# Patient Record
Sex: FEMALE | Race: WHITE | NOT HISPANIC OR LATINO | Employment: OTHER | URBAN - NONMETROPOLITAN AREA
[De-identification: names, ages, dates, MRNs, and addresses within clinical notes are randomized per-mention and may not be internally consistent; named-entity substitution may affect disease eponyms.]

---

## 2023-06-13 ENCOUNTER — OFFICE VISIT (OUTPATIENT)
Dept: FAMILY MEDICINE CLINIC | Facility: CLINIC | Age: 78
End: 2023-06-13
Payer: MEDICARE

## 2023-06-13 VITALS
OXYGEN SATURATION: 97 % | BODY MASS INDEX: 18.61 KG/M2 | HEART RATE: 83 BPM | DIASTOLIC BLOOD PRESSURE: 70 MMHG | SYSTOLIC BLOOD PRESSURE: 114 MMHG | WEIGHT: 105 LBS | HEIGHT: 63 IN

## 2023-06-13 DIAGNOSIS — M25.561 CHRONIC PAIN OF RIGHT KNEE: ICD-10-CM

## 2023-06-13 DIAGNOSIS — M23.206 OLD TEAR OF MENISCUS OF RIGHT KNEE, UNSPECIFIED MENISCUS, UNSPECIFIED TEAR TYPE: ICD-10-CM

## 2023-06-13 DIAGNOSIS — G25.81 RESTLESS LEG SYNDROME: Primary | ICD-10-CM

## 2023-06-13 DIAGNOSIS — G89.29 CHRONIC PAIN OF RIGHT KNEE: ICD-10-CM

## 2023-06-13 DIAGNOSIS — R00.2 PALPITATIONS: ICD-10-CM

## 2023-06-13 DIAGNOSIS — R26.81 GAIT INSTABILITY: ICD-10-CM

## 2023-06-13 PROBLEM — I10 HYPERTENSION, ESSENTIAL: Status: RESOLVED | Noted: 2023-06-13 | Resolved: 2023-06-13

## 2023-06-13 PROBLEM — I10 HYPERTENSION, ESSENTIAL: Status: ACTIVE | Noted: 2023-06-13

## 2023-06-13 RX ORDER — DIAZEPAM 5 MG/1
5 TABLET ORAL 2 TIMES DAILY PRN
COMMUNITY
End: 2023-06-13

## 2023-06-13 RX ORDER — ATENOLOL 25 MG/1
25 TABLET ORAL DAILY
Qty: 90 TABLET | Refills: 1 | Status: SHIPPED | OUTPATIENT
Start: 2023-06-13

## 2023-06-13 RX ORDER — ATENOLOL 25 MG/1
25 TABLET ORAL DAILY
COMMUNITY
End: 2023-06-13 | Stop reason: SDUPTHER

## 2023-06-13 RX ORDER — ROPINIROLE 0.5 MG/1
TABLET, FILM COATED ORAL
Qty: 90 TABLET | Refills: 3 | Status: SHIPPED | OUTPATIENT
Start: 2023-06-13

## 2023-06-13 NOTE — ASSESSMENT & PLAN NOTE
Discussed recent work-up with orthopedic consultation and imaging including x-ray and MRI.  She is interested in starting with physical therapy and if not improving will consider another orthopedic opinion but does not want knee replacement at this time

## 2023-06-13 NOTE — LETTER
June 13, 2023     Patient: Mary Goncalves   YOB: 1945   Date of Visit: 6/13/2023           Dx R knee pain, history of meniscal tear, bilateral shoulder pain    Physical therapy up to 3 times/week for 6 weeks.  Please evaluate and treat.         Sincerely,          Scott Helton MD        CC: No Recipients

## 2023-06-13 NOTE — ASSESSMENT & PLAN NOTE
Discussed restless legs treatment and that Valium is not an effective treatment or recommended for chronic therapy at her age.    Lengthy discussion with patient and her daughter how to safely taper off Valium at her current 5 mg daily dosing.  She does have sufficient supply from her past PCP to taper off Valium without a refill prescription today.  She will reduce to 2.5 mg at bedtime for the next 1 to 2 weeks and then cut down to 1.25 mg at bedtime for the next 1 to 2 weeks and then go to every other day and stop Valium.    Start Requip at 0.25 mg with instructions to taper up to 0.5 mg.  She was given instructions that she can go up by 0.5 mg every week up to a max of 4 mg daily.    Recheck at follow-up in 1 month or sooner if problems arise

## 2023-06-13 NOTE — ASSESSMENT & PLAN NOTE
Continue atenolol for palpitation.  Working to get past records from cardiology.  No murmur appreciated on exam today.  Reports mitral valve prolapse and she was instructed to premedicate before dental cleaning.  Discussed this is no longer recommendation for mitral valve prolapse but we will need to review her records in more detail to make sure she does not have any other underlying problems that would require premedication before dental cleaning

## 2023-06-13 NOTE — PROGRESS NOTES
Chief Complaint  Establish Care, Leg Pain (Right leg pain since October of last year), and Insomnia (Patient reports that she isnt able to sleep at night)    Subjective    History of Present Illness:  Mary Goncalves is a 78 y.o. female who presents today for new patient visit to establish care and discuss ongoing problems with right knee and leg pain.    She is transferring from her past PCP in [facility].    Here with her daughter and reports a history of palpitations with mitral valve prolapse.  She does have cardiology follow-up in [place].  No history of murmur.  She is on atenolol for palpitations and is requesting refills.    She reports that she has been on Valium for years from her past PCP for restless legs problems.  Her prescription is written at 5 mg twice a day but she only takes 5 mg at bedtime.  She has not been on any medications specific for restless legs and reports that her restless leg symptoms are not adequately controlled with Valium.    Discussed with patient and her daughter at length today concerns with valium especially in older adults due to its metabolism problems and increased fall risk along with increased risk for cognitive issues including dementia memory problems.    She is willing to taper off Valium and has a sufficient quantity to allow her to safely taper off her prescription from her past PCP without refills today.    We discussed in detail how to slowly taper off Valium and transition to Requip for restless legs.    She is having ongoing problems with right knee and leg pain and is seeing orthopedics in [facility] with x-ray and MRI work-up that revealed arthritis and a meniscal tear.  They did not recommend repair of her meniscal tear but did discuss knee replacement.  She is not interested in knee replacement at this time and has tried steroid injections along with gel injections without improvement.  She is requesting an order for physical therapy given  "her recurrent right knee problems and bilateral shoulder problems that have developed since she has had to use a walker more due to knee instability.    Discussed past-due for health maintenance and screening along with vaccination updates and she plans to get up-to-date in the future with her Medicare wellness visit and get fasting labs up-to-date.    Objective   Vital Signs:   /70 (BP Location: Left arm, Patient Position: Sitting, Cuff Size: Adult)   Pulse 83   Ht 160 cm (63\")   Wt 47.6 kg (105 lb)   SpO2 97%   BMI 18.60 kg/m²     Review of Systems   Constitutional:  Negative for appetite change, chills and fever.   HENT:  Negative for hearing loss.    Eyes:  Negative for blurred vision.   Respiratory:  Negative for chest tightness.    Cardiovascular:  Negative for chest pain.   Gastrointestinal:  Negative for abdominal pain.   Musculoskeletal:  Positive for arthralgias and gait problem.   Skin:  Negative for rash.   Neurological:         Restless legs   Psychiatric/Behavioral:  Positive for sleep disturbance. Negative for depressed mood.      Past History:  Medical History: has no past medical history on file.   Surgical History: has no past surgical history on file.   Family History: Family history is unknown by patient.   Social History: reports that she has never smoked. She has never used smokeless tobacco. She reports that she does not currently use alcohol. She reports that she does not use drugs.      Current Outpatient Medications:     atenolol (TENORMIN) 25 MG tablet, Take 1 tablet by mouth Daily., Disp: 90 tablet, Rfl: 1    rOPINIRole (REQUIP) 0.5 MG tablet, 1/2 tab po QHS x 2 days, then 1 po QHS.  May increase by 1 tab every week as needed for restless legs.  Max 4mg/evening.  Replaces Valium, Disp: 90 tablet, Rfl: 3    Allergies: Flexeril [cyclobenzaprine] and Statins    Physical Exam  Constitutional:       Appearance: Normal appearance.   HENT:      Head: Normocephalic.      Right Ear: " External ear normal.      Left Ear: External ear normal.      Nose: Nose normal.   Eyes:      Pupils: Pupils are equal, round, and reactive to light.   Cardiovascular:      Rate and Rhythm: Normal rate and regular rhythm.      Heart sounds: Normal heart sounds. No murmur heard.    No friction rub. No gallop.   Pulmonary:      Effort: Pulmonary effort is normal.      Breath sounds: Normal breath sounds.   Musculoskeletal:      Cervical back: Normal range of motion.      Comments: Using walker for ambulatory support given right leg and knee pain.  No effusion.  No joint line tenderness.  Mild crepitus.  No knee instability on exam.   Skin:     General: Skin is warm and dry.   Neurological:      General: No focal deficit present.      Mental Status: She is alert and oriented to person, place, and time.      Gait: Gait abnormal.   Psychiatric:         Mood and Affect: Mood normal.         Behavior: Behavior normal.         Thought Content: Thought content normal.        Result Review                   Assessment and Plan  Diagnoses and all orders for this visit:    1. Restless leg syndrome (Primary)  Assessment & Plan:  Discussed restless legs treatment and that Valium is not an effective treatment or recommended for chronic therapy at her age.    Lengthy discussion with patient and her daughter how to safely taper off Valium at her current 5 mg daily dosing.  She does have sufficient supply from her past PCP to taper off Valium without a refill prescription today.  She will reduce to 2.5 mg at bedtime for the next 1 to 2 weeks and then cut down to 1.25 mg at bedtime for the next 1 to 2 weeks and then go to every other day and stop Valium.    Start Requip at 0.25 mg with instructions to taper up to 0.5 mg.  She was given instructions that she can go up by 0.5 mg every week up to a max of 4 mg daily.    Recheck at follow-up in 1 month or sooner if problems arise    Orders:  -     rOPINIRole (REQUIP) 0.5 MG tablet; 1/2  tab po QHS x 2 days, then 1 po QHS.  May increase by 1 tab every week as needed for restless legs.  Max 4mg/evening.  Replaces Valium  Dispense: 90 tablet; Refill: 3    2. Chronic pain of right knee  Assessment & Plan:  Discussed recent work-up with orthopedic consultation and imaging including x-ray and MRI.  She is interested in starting with physical therapy and if not improving will consider another orthopedic opinion but does not want knee replacement at this time      3. Old tear of meniscus of right knee, unspecified meniscus, unspecified tear type  Assessment & Plan:  See above      4. Gait instability  Assessment & Plan:  See above      5. Palpitations  Assessment & Plan:  Continue atenolol for palpitation.  Working to get past records from cardiology.  No murmur appreciated on exam today.  Reports mitral valve prolapse and she was instructed to premedicate before dental cleaning.  Discussed this is no longer recommendation for mitral valve prolapse but we will need to review her records in more detail to make sure she does not have any other underlying problems that would require premedication before dental cleaning    Orders:  -     atenolol (TENORMIN) 25 MG tablet; Take 1 tablet by mouth Daily.  Dispense: 90 tablet; Refill: 1        BMI is within normal parameters. No other follow-up for BMI required.          Follow Up  Return in about 6 weeks (around 7/25/2023) for Med recheck, Fasting for labs at appointment (but drink water!).    Scott Helton MD

## 2023-08-08 ENCOUNTER — OFFICE VISIT (OUTPATIENT)
Dept: FAMILY MEDICINE CLINIC | Facility: CLINIC | Age: 78
End: 2023-08-08
Payer: MEDICARE

## 2023-08-08 VITALS
HEART RATE: 76 BPM | TEMPERATURE: 97.3 F | WEIGHT: 102.6 LBS | OXYGEN SATURATION: 99 % | BODY MASS INDEX: 18.18 KG/M2 | HEIGHT: 63 IN | SYSTOLIC BLOOD PRESSURE: 110 MMHG | DIASTOLIC BLOOD PRESSURE: 72 MMHG

## 2023-08-08 DIAGNOSIS — R26.81 GAIT INSTABILITY: ICD-10-CM

## 2023-08-08 DIAGNOSIS — E55.9 VITAMIN D DEFICIENCY: ICD-10-CM

## 2023-08-08 DIAGNOSIS — M70.50 PES ANSERINE BURSITIS: ICD-10-CM

## 2023-08-08 DIAGNOSIS — G25.81 RESTLESS LEG SYNDROME: Chronic | ICD-10-CM

## 2023-08-08 DIAGNOSIS — E78.01 FAMILIAL HYPERCHOLESTEREMIA: ICD-10-CM

## 2023-08-08 DIAGNOSIS — Z79.899 HIGH RISK MEDICATION USE: ICD-10-CM

## 2023-08-08 DIAGNOSIS — G89.29 CHRONIC PAIN OF RIGHT KNEE: Chronic | ICD-10-CM

## 2023-08-08 DIAGNOSIS — M25.561 CHRONIC PAIN OF RIGHT KNEE: Chronic | ICD-10-CM

## 2023-08-08 DIAGNOSIS — E78.2 HYPERLIPIDEMIA, MIXED: ICD-10-CM

## 2023-08-08 DIAGNOSIS — Z11.59 NEED FOR HEPATITIS C SCREENING TEST: ICD-10-CM

## 2023-08-08 DIAGNOSIS — Z78.0 ENCOUNTER FOR OSTEOPOROSIS SCREENING IN ASYMPTOMATIC POSTMENOPAUSAL PATIENT: ICD-10-CM

## 2023-08-08 DIAGNOSIS — M79.604 ANTERIOR LEG PAIN, RIGHT: ICD-10-CM

## 2023-08-08 DIAGNOSIS — L98.422 SKIN ULCER OF SACRUM WITH FAT LAYER EXPOSED: ICD-10-CM

## 2023-08-08 DIAGNOSIS — Z00.00 GENERAL MEDICAL EXAM: Primary | ICD-10-CM

## 2023-08-08 DIAGNOSIS — R00.2 PALPITATIONS: Chronic | ICD-10-CM

## 2023-08-08 DIAGNOSIS — R25.2 MUSCLE CRAMP: ICD-10-CM

## 2023-08-08 DIAGNOSIS — M81.0 AGE-RELATED OSTEOPOROSIS WITHOUT CURRENT PATHOLOGICAL FRACTURE: ICD-10-CM

## 2023-08-08 DIAGNOSIS — Z13.820 ENCOUNTER FOR OSTEOPOROSIS SCREENING IN ASYMPTOMATIC POSTMENOPAUSAL PATIENT: ICD-10-CM

## 2023-08-08 NOTE — PROGRESS NOTES
The ABCs of the Annual Wellness Visit  Subsequent Medicare Wellness Visit    Subjective    Mary Goncalves is a 78 y.o. female who presents for a Subsequent Medicare Wellness Visit and physical exam.    She is also here to follow-up after establishing care last month and is again here with her daughter for today's visit.    She has ongoing problems with his right anterior leg pain inferior to the patellar tendon area concerning for pes anserine bursitis.  She has undergone work-up with orthopedics with failed knee injections and physical therapy.  She is using a walker due to her recurrent leg pain.  She is interested in a consultation given concerns for pes anserine bursitis with Dr. Reyes..    The following portions of the patient's history were reviewed and   updated as appropriate: allergies, current medications, past family history, past medical history, past social history, past surgical history, and problem list.    Compared to one year ago, the patient feels her physical   health is worse.    Compared to one year ago, the patient feels her mental   health is the same.    Recent Hospitalizations:  She was not admitted to the hospital during the last year.       Current Medical Providers:  Patient Care Team:  Scott Helton MD as PCP - General (Family Medicine)    Outpatient Medications Prior to Visit   Medication Sig Dispense Refill    atenolol (TENORMIN) 25 MG tablet Take 1 tablet by mouth Daily. 90 tablet 1    rOPINIRole (REQUIP) 0.5 MG tablet 1/2 tab po QHS x 2 days, then 1 po QHS.  May increase by 1 tab every week as needed for restless legs.  Max 4mg/evening.  Replaces Valium (Patient taking differently: Take 2 tablets by mouth Every Night.) 90 tablet 3     No facility-administered medications prior to visit.       No opioid medication identified on active medication list. I have reviewed chart for other potential  high risk medication/s and harmful drug interactions in the  "elderly.        Aspirin is not on active medication list.  Aspirin use is not indicated based on review of current medical condition/s. Risk of harm outweighs potential benefits.  .    Patient Active Problem List   Diagnosis    Restless leg syndrome    Chronic pain of right knee    Old tear of meniscus of right knee    Gait instability    Palpitations    Anterior leg pain, right    General medical exam    Familial hypercholesteremia    Skin ulcer of sacrum with fat layer exposed     Advance Care Planning   Advance Care Planning     Advance Directive is not on file.  ACP discussion was held with the patient during this visit. Patient does not have an advance directive, information provided.     Objective    Vitals:    23 0937   BP: 110/72   BP Location: Right arm   Patient Position: Sitting   Cuff Size: Adult   Pulse: 76   Temp: 97.3 øF (36.3 øC)   TempSrc: Temporal   SpO2: 99%   Weight: 46.5 kg (102 lb 9.6 oz)   Height: 160 cm (63\")     Estimated body mass index is 18.17 kg/mý as calculated from the following:    Height as of this encounter: 160 cm (63\").    Weight as of this encounter: 46.5 kg (102 lb 9.6 oz).    BMI is below normal parameters (malnutrition). Recommendations: treating the underlying disease process      Does the patient have evidence of cognitive impairment? No          HEALTH RISK ASSESSMENT    Smoking Status:  Social History     Tobacco Use   Smoking Status Never   Smokeless Tobacco Never     Alcohol Consumption:  Social History     Substance and Sexual Activity   Alcohol Use Not Currently     Fall Risk Screen:    STEADI Fall Risk Assessment was completed, and patient is at LOW risk for falls.Assessment completed on:2023    Depression Screenin/13/2023     9:05 AM   PHQ-2/PHQ-9 Depression Screening   Little Interest or Pleasure in Doing Things 1-->several days   Feeling Down, Depressed or Hopeless 1-->several days   Trouble Falling or Staying Asleep, or Sleeping Too Much " 1-->several days   Feeling Tired or Having Little Energy 1-->several days   Poor Appetite or Overeating 1-->several days   Feeling Bad about Yourself - or that You are a Failure or Have Let Yourself or Your Family Down 1-->several days   Trouble Concentrating on Things, Such as Reading the Newspaper or Watching Television 1-->several days   Moving or Speaking So Slowly that Other People Could Have Noticed? Or the Opposite - Being So Fidgety 1-->several days   Thoughts that You Would be Better Off Dead or of Hurting Yourself in Some Way 0-->not at all   PHQ-9: Brief Depression Severity Measure Score 8   If You Checked Off Any Problems, How Difficult Have These Problems Made It For You to Do Your Work, Take Care of Things at Home, or Get Along with Other People? not difficult at all       Health Habits and Functional and Cognitive Screenin/8/2023    10:00 AM   Functional & Cognitive Status   Do you have difficulty preparing food and eating? No   Do you have difficulty bathing yourself, getting dressed or grooming yourself? No   Do you have difficulty using the toilet? No   Do you have difficulty moving around from place to place? No   Do you have trouble with steps or getting out of a bed or a chair? No   Current Diet Well Balanced Diet   Dental Exam Up to date   Eye Exam Up to date   Exercise (times per week) 0 times per week   Current Exercises Include No Regular Exercise   Do you need help using the phone?  No   Are you deaf or do you have serious difficulty hearing?  No   Do you need help to go to places out of walking distance? Yes   Do you need help shopping? Yes   Do you need help preparing meals?  No   Do you need help with housework?  No   Do you need help with laundry? No   Do you need help taking your medications? No   Do you need help managing money? No   Do you ever drive or ride in a car without wearing a seat belt? No   Have you felt unusual stress, anger or loneliness in the last month? Yes    Who do you live with? Alone   If you need help, do you have trouble finding someone available to you? No   Have you been bothered in the last four weeks by sexual problems? No   Do you have difficulty concentrating, remembering or making decisions? No       Age-appropriate Screening Schedule:  Refer to the list below for future screening recommendations based on patient's age, sex and/or medical conditions. Orders for these recommended tests are listed in the plan section. The patient has been provided with a written plan.    Health Maintenance   Topic Date Due    DXA SCAN  Never done    TDAP/TD VACCINES (1 - Tdap) Never done    HEPATITIS C SCREENING  Never done    LIPID PANEL  Never done    INFLUENZA VACCINE  10/01/2023    ANNUAL WELLNESS VISIT  08/08/2024    Pneumococcal Vaccine 65+  Completed    COVID-19 Vaccine  Discontinued    ZOSTER VACCINE  Discontinued                  CMS Preventative Services Quick Reference  Risk Factors Identified During Encounter  Chronic Pain: Natural history and expected course discussed. Questions answered.  Fall Risk-High or Moderate: Discussed Fall Prevention in the home  The above risks/problems have been discussed with the patient.  Pertinent information has been shared with the patient in the After Visit Summary.  An After Visit Summary and PPPS were made available to the patient.    Follow Up:   Next Medicare Wellness visit to be scheduled in 1 year.       Additional E&M Note during same encounter follows:  Patient has multiple medical problems which are significant and separately identifiable that require additional work above and beyond the Medicare Wellness Visit.      Chief Complaint  Restless Legs Syndrome    Subjective        HPI  Mary Goncalves is also being seen today for follow-up after establishing care last month and is again here with her daughter for today's visit.    She has ongoing problems with his right anterior leg pain inferior to the patellar tendon area  concerning for pes anserine bursitis.  She has undergone work-up with orthopedics with failed knee injections and physical therapy.  She is using a walker due to her recurrent leg pain.  She is interested in a consultation given concerns for pes anserine bursitis with Dr. Reyes.    History of osteoporosis and is willing to get up-to-date with bone density.  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  She is willing to consider Prolia based upon her bone density when it returns.    She is fasting to get blood work up-to-date and would like hepatitis C screening done with lab work today.    History of familial hypercholesterolemia and failed numerous statins.  We did discuss that he has an alternative and at this time she would like to hold off on cholesterol medications.  She does understand the risk of untreated hyperlipidemia.    Palpitations stable with atenolol and she has been on this for years and has not had any problems with blood pressure elevation.    Restless leg symptoms treated with Valium in the past by her past PCP or doing well with Requip up to 1 mg.  She is happy with her current dosing but does understand room to adjust up her dosing if needed.    She does have a history of vitamin D deficiency along with osteoporosis and is willing to get a vitamin D level checked with blood work today.        Review of Systems   Constitutional:  Negative for activity change, appetite change, chills, fatigue and fever.   HENT:  Negative for ear pain, hearing loss and trouble swallowing.    Eyes:  Negative for pain and visual disturbance.   Respiratory:  Negative for cough, chest tightness, shortness of breath and wheezing.    Cardiovascular:  Negative for chest pain, palpitations and leg swelling.   Gastrointestinal:  Negative for abdominal pain and blood in stool.   Genitourinary:  Negative for difficulty urinating.   Musculoskeletal:  Positive for arthralgias,  "back pain, gait problem, joint swelling and myalgias.   Skin:  Negative for rash.   Neurological:  Negative for dizziness, weakness and light-headedness.   Psychiatric/Behavioral:  Positive for sleep disturbance. Negative for agitation, behavioral problems and dysphoric mood.      Objective   Vital Signs:  /72 (BP Location: Right arm, Patient Position: Sitting, Cuff Size: Adult)   Pulse 76   Temp 97.3 øF (36.3 øC) (Temporal)   Ht 160 cm (63\")   Wt 46.5 kg (102 lb 9.6 oz)   SpO2 99%   BMI 18.17 kg/mý     Physical Exam  Constitutional:       Appearance: Normal appearance.   HENT:      Head: Normocephalic.      Right Ear: External ear normal.      Left Ear: External ear normal.      Nose: Nose normal.   Eyes:      Pupils: Pupils are equal, round, and reactive to light.   Cardiovascular:      Rate and Rhythm: Normal rate and regular rhythm.      Heart sounds: Normal heart sounds.   Pulmonary:      Effort: Pulmonary effort is normal.      Breath sounds: Normal breath sounds.   Musculoskeletal:         General: Normal range of motion.      Cervical back: Normal range of motion.      Comments: Using walker for gait stability and ongoing chronic right lower leg pain.  She does have symptoms concerning for pes anserine bursitis and has failed knee injections with orthopedics.  Interested in seeing Dr. Reyes    Tenderness along anterior tibia inferior to the patellar tendon.  It is widespread and not localized to the medial location but does have some features concerning for pes anserinus bursitis   Skin:     General: Skin is warm and dry.   Neurological:      General: No focal deficit present.      Mental Status: She is alert.   Psychiatric:         Mood and Affect: Mood normal.         Behavior: Behavior normal.         Thought Content: Thought content normal.                       Assessment and Plan   Diagnoses and all orders for this visit:    1. General medical exam (Primary)  Assessment & Plan:  Discussed " together health maintenance and screening along with vaccination options and healthy diet and exercise habits as part of the preventative counseling at their physical exam today.     Scheduling DEXA.  Awaiting fasting labs.  Encouraged advance directive today.      2. Anterior leg pain, right  Assessment & Plan:  See above    Orders:  -     Ambulatory Referral to Orthopedic Surgery    3. Chronic pain of right knee  Assessment & Plan:  Scheduling consultation with Dr. Reyes to consider further work-up and treatment of pes anserine bursitis.  Continue with physical therapy and walker use.    Orders:  -     Ambulatory Referral to Orthopedic Surgery  -     Ferritin; Future  -     Ferritin    4. Restless leg syndrome  Assessment & Plan:  Improving with Requip.  Continue with current 1 mg dosing    Orders:  -     Ferritin; Future  -     Ferritin    5. Palpitations  Assessment & Plan:  Continue atenolol for palpitation.  Working to get past records from cardiology.  No murmur appreciated on exam today.  Reports mitral valve prolapse and she was instructed to premedicate before dental cleaning.  Discussed this is no longer recommendation for mitral valve prolapse but we will need to review her records in more detail to make sure she does not have any other underlying problems that would require premedication before dental cleaning    Orders:  -     Magnesium; Future  -     Magnesium    6. Gait instability  Assessment & Plan:  See above    Orders:  -     Ambulatory Referral to Orthopedic Surgery    7. Pes anserine bursitis  -     Ambulatory Referral to Orthopedic Surgery    8. Hyperlipidemia, mixed  -     CBC Auto Differential; Future  -     Comprehensive Metabolic Panel; Future  -     Lipid Panel; Future  -     TSH; Future  -     T4, Free; Future  -     CBC Auto Differential  -     Comprehensive Metabolic Panel  -     Lipid Panel  -     TSH  -     T4, Free    9. Muscle cramp  -     Ferritin; Future  -     CK; Future  -      Ferritin  -     CK    10. High risk medication use  -     Ferritin; Future  -     Ferritin    11. Skin ulcer of sacrum with fat layer exposed  Assessment & Plan:  Ongoing wound care follow-up for sacral ulcer.      12. Encounter for osteoporosis screening in asymptomatic postmenopausal patient  -     DEXA Bone Density Axial; Future    13. Age-related osteoporosis without current pathological fracture  -     Vitamin D,25-Hydroxy; Future  -     Vitamin D,25-Hydroxy    14. Vitamin D deficiency  -     Vitamin D,25-Hydroxy; Future  -     Vitamin D,25-Hydroxy    15. Need for hepatitis C screening test  -     HCV Antibody Rfx To Qnt PCR; Future  -     HCV Antibody Rfx To Qnt PCR    16. Familial hypercholesteremia  Assessment & Plan:  Reactions to statins.  Declines prescription medications for cholesterol.  Awaiting check on fasting labs               Follow Up   Return in about 2 months (around 10/8/2023) for Med recheck.  Patient was given instructions and counseling regarding her condition or for health maintenance advice. Please see specific information pulled into the AVS if appropriate.

## 2023-08-08 NOTE — ASSESSMENT & PLAN NOTE
Scheduling consultation with Dr. Reyes to consider further work-up and treatment of pes anserine bursitis.  Continue with physical therapy and walker use.

## 2023-08-08 NOTE — ASSESSMENT & PLAN NOTE
Reactions to statins.  Declines prescription medications for cholesterol.  Awaiting check on fasting labs

## 2023-08-08 NOTE — ASSESSMENT & PLAN NOTE
Discussed together health maintenance and screening along with vaccination options and healthy diet and exercise habits as part of the preventative counseling at their physical exam today.     Scheduling DEXA.  Awaiting fasting labs.  Encouraged advance directive today.

## 2023-08-09 LAB
25(OH)D3+25(OH)D2 SERPL-MCNC: 117 NG/ML (ref 30–100)
ALBUMIN SERPL-MCNC: 4.1 G/DL (ref 3.8–4.8)
ALBUMIN/GLOB SERPL: 1.2 {RATIO} (ref 1.2–2.2)
ALP SERPL-CCNC: 104 IU/L (ref 44–121)
ALT SERPL-CCNC: 11 IU/L (ref 0–32)
AST SERPL-CCNC: 21 IU/L (ref 0–40)
BASOPHILS # BLD AUTO: NORMAL 10*3/UL
BILIRUB SERPL-MCNC: 0.4 MG/DL (ref 0–1.2)
BUN SERPL-MCNC: 13 MG/DL (ref 8–27)
BUN/CREAT SERPL: 21 (ref 12–28)
CALCIUM SERPL-MCNC: 9.3 MG/DL (ref 8.7–10.3)
CHLORIDE SERPL-SCNC: 100 MMOL/L (ref 96–106)
CHOLEST SERPL-MCNC: 194 MG/DL (ref 100–199)
CK SERPL-CCNC: 67 U/L (ref 32–182)
CO2 SERPL-SCNC: 20 MMOL/L (ref 20–29)
CREAT SERPL-MCNC: 0.63 MG/DL (ref 0.57–1)
EGFRCR SERPLBLD CKD-EPI 2021: 90 ML/MIN/1.73
EOSINOPHIL # BLD AUTO: NORMAL 10*3/UL
EOSINOPHIL NFR BLD AUTO: NORMAL %
FERRITIN SERPL-MCNC: 89 NG/ML (ref 15–150)
GLOBULIN SER CALC-MCNC: 3.3 G/DL (ref 1.5–4.5)
GLUCOSE SERPL-MCNC: 82 MG/DL (ref 70–99)
HCT VFR BLD AUTO: NORMAL %
HCV AB SERPL QL IA: NORMAL
HCV IGG SERPL QL IA: NON REACTIVE
HDLC SERPL-MCNC: 57 MG/DL
HGB BLD-MCNC: NORMAL G/DL
LDLC SERPL CALC-MCNC: 115 MG/DL (ref 0–99)
LYMPHOCYTES # BLD AUTO: NORMAL 10*3/UL
LYMPHOCYTES NFR BLD AUTO: NORMAL %
MAGNESIUM SERPL-MCNC: 2.4 MG/DL (ref 1.6–2.3)
MONOCYTES NFR BLD AUTO: NORMAL %
NEUTROPHILS NFR BLD AUTO: NORMAL %
PLATELET # BLD AUTO: NORMAL 10*3/UL
POTASSIUM SERPL-SCNC: 4.8 MMOL/L (ref 3.5–5.2)
PROT SERPL-MCNC: 7.4 G/DL (ref 6–8.5)
RBC # BLD AUTO: NORMAL 10*6/UL
SODIUM SERPL-SCNC: 138 MMOL/L (ref 134–144)
T4 FREE SERPL-MCNC: 1.28 NG/DL (ref 0.82–1.77)
TRIGL SERPL-MCNC: 124 MG/DL (ref 0–149)
TSH SERPL DL<=0.005 MIU/L-ACNC: 1.46 UIU/ML (ref 0.45–4.5)
VLDLC SERPL CALC-MCNC: 22 MG/DL (ref 5–40)
WBC # BLD AUTO: NORMAL X10E3/UL

## 2023-08-09 NOTE — PROGRESS NOTES
THE MESSAGE BELOW IS ABLE TO BE GIVEN BY THE HUB.  THE HUB MAY SCHEDULE A FOLLOW-UP VISIT FOR THE PATIENT IF INDICATED IN THE MESSAGE BELOW...    Please call patient and/or patient's daughter with recent labs:    Her vitamin D level returned elevated.  She needs to stop her vitamin D supplementation at this time given her vitamin D level is elevated.    Her cholesterol returned good.    Her comprehensive metabolic panel returned normal with good kidney function, normal liver enzymes, normal electrolyte levels, and normal fasting blood sugar.    We are still waiting on the remainder of her labs to return and we will contact her with those results when they become available

## 2023-08-11 ENCOUNTER — TELEPHONE (OUTPATIENT)
Dept: FAMILY MEDICINE CLINIC | Facility: CLINIC | Age: 78
End: 2023-08-11
Payer: COMMERCIAL

## 2023-08-11 NOTE — TELEPHONE ENCOUNTER
PATIENT CALLED BACK. I READ [facility] TO READ. SHE AGREED TO STOP TAKING VITAMIN D. SHE ALREADY HAS AN APPOINTMENT IN OCTOBER SO SHE DIDN'T NEED TO MAKE ANOTHER ONE.  SHE SAID SHE APPRECIATES OUR CARE AND TREATMENT. VLAD UP AND STAFF.

## 2023-08-11 NOTE — TELEPHONE ENCOUNTER
----- Message from Scott Helton MD sent at 8/9/2023  7:35 AM EDT -----  THE MESSAGE BELOW IS ABLE TO BE GIVEN BY THE HUB.  THE HUB MAY SCHEDULE A FOLLOW-UP VISIT FOR THE PATIENT IF INDICATED IN THE MESSAGE BELOW...    Please call patient and/or patient's daughter with recent labs:    Her vitamin D level returned elevated.  She needs to stop her vitamin D supplementation at this time given her vitamin D level is elevated.    Her cholesterol returned good.    Her comprehensive metabolic panel returned normal with good kidney function, normal liver enzymes, normal electrolyte levels, and normal fasting blood sugar.    We are still waiting on the remainder of her labs to return and we will contact her with those results when they become available      [facility] CAN READ

## 2023-08-22 ENCOUNTER — OFFICE VISIT (OUTPATIENT)
Dept: ORTHOPEDIC SURGERY | Facility: CLINIC | Age: 78
End: 2023-08-22
Payer: MEDICARE

## 2023-08-22 VITALS
DIASTOLIC BLOOD PRESSURE: 79 MMHG | SYSTOLIC BLOOD PRESSURE: 121 MMHG | HEIGHT: 63 IN | BODY MASS INDEX: 18.16 KG/M2 | WEIGHT: 102.51 LBS

## 2023-08-22 DIAGNOSIS — M17.11 PRIMARY OSTEOARTHRITIS OF RIGHT KNEE: ICD-10-CM

## 2023-08-22 DIAGNOSIS — M25.561 RIGHT KNEE PAIN, UNSPECIFIED CHRONICITY: Primary | ICD-10-CM

## 2023-08-22 DIAGNOSIS — M70.50 PES ANSERINE BURSITIS: ICD-10-CM

## 2023-08-22 NOTE — PROGRESS NOTES
[facility] [place] [facility] Office Visit     Office Visit       Date: 08/22/2023   Patient Name: Mary Goncalves  MRN: [id number]  YOB: 1945    Referring Physician: Scott Helton,*     Chief Complaint:   Chief Complaint   Patient presents with    Right Knee - Pain, Initial Evaluation     Anterior Tibial Location       History of Present Illness:   Mary Goncalves is a 78 y.o. female who presents with right knee pain for 10 month(s). Onset atraumatic and gradual in nature. Pain is localized to the below knee/shin area and is a 10/10 on the pain scale. Pain is described as stabbing. Associated symptoms include pain, swelling, popping, grinding, and stiffness. The pain is worse with walking, sleeping, lying on affected side, and rising from seated position; resting, assistive device (cane/walker), pain medication and/or NSAID, and topical make it better. Previous treatments have included: cane/walker, NSAIDS, physical therapy, weight loss, visco injection x 5 shot series [facility] and steroid injection (last injection 11/2022- Dr. Erick Collins) since symptom onset. Although some transient relief was reported with these interventions, these conservative measures have failed and symptoms have persisted. The patient is limited in daily activities and has had a significant decrease in quality of life as a result. She denies fevers, chills, or constitutional symptoms. Patient is not a smoker.       Subjective   Review of Systems: Review of Systems   Constitutional: Negative.  Negative for chills, fever, unexpected weight gain and unexpected weight loss.   HENT: Negative.  Negative for congestion, postnasal drip and rhinorrhea.    Eyes: Negative.  Negative for blurred vision.   Respiratory: Negative.  Negative for shortness of breath.    Cardiovascular: Negative.  Negative for leg swelling.   Gastrointestinal: Negative.  Negative for abdominal  pain, nausea and vomiting.   Endocrine: Negative.    Genitourinary: Negative.  Negative for difficulty urinating.   Musculoskeletal:  Positive for arthralgias. Negative for gait problem, joint swelling and myalgias.   Skin:  Negative for skin lesions and wound.   Neurological:  Negative for dizziness, weakness, light-headedness and numbness.   Hematological: Negative.  Does not bruise/bleed easily.   Psychiatric/Behavioral: Negative.  Negative for depressed mood.    All other systems reviewed and are negative.     I have reviewed the following portions of the patient's history:History of Present Illness and review of systems.    Past Medical History:   Past Medical History:   Diagnosis Date    Irregular heart beat     Restless legs syndrome (RLS)        Past Surgical History:   Past Surgical History:   Procedure Laterality Date    DILATATION AND CURETTAGE      HYSTERECTOMY      TONSILLECTOMY         Family History:   Family History   Family history unknown: Yes       Social History:   Social History     Socioeconomic History    Marital status:    Tobacco Use    Smoking status: Never    Smokeless tobacco: Never   Vaping Use    Vaping Use: Never used   Substance and Sexual Activity    Alcohol use: Not Currently    Drug use: Never    Sexual activity: Defer       Medications:   Current Outpatient Medications:     atenolol (TENORMIN) 25 MG tablet, Take 1 tablet by mouth Daily., Disp: 90 tablet, Rfl: 1    rOPINIRole (REQUIP) 0.5 MG tablet, 1/2 tab po QHS x 2 days, then 1 po QHS.  May increase by 1 tab every week as needed for restless legs.  Max 4mg/evening.  Replaces Valium (Patient taking differently: Take 2 tablets by mouth Every Night.), Disp: 90 tablet, Rfl: 3    Diclofenac Sodium (VOLTAREN) 1 % gel gel, Apply 4g to affected area 3-4 times per day, Disp: 100 g, Rfl: 1    Allergies:   Allergies   Allergen Reactions    Flexeril [Cyclobenzaprine] Unknown - High Severity    Statins Unknown - High Severity       I  "reviewed the patient's chief complaint, history of present illness, review of systems, past medical history, surgical history, family history, social history, medications and allergy list.     Objective    Vital Signs:   Vitals:    08/22/23 0901   BP: 121/79   Weight: 46.5 kg (102 lb 8.2 oz)   Height: 160 cm (62.99\")     Body mass index is 18.16 kg/mý. BMI is below normal parameters (malnutrition). Recommendations: none (medical contraindication)     Patient reports that she is a non-smoker and has not ever been a smoker.  This behavior was applauded and she was encouraged to continue in smoking cessation.  We will continue to monitor at subsequent visits.    Ortho Exam:  Right knee: No erythema, ecchymosis, but + swelling.  Tender to palpation over the pes bursa.  Limited extension of the knee due to pain. Full flexion of the knee.  Stable to varus and valgus stress.  Negative anterior posterior drawer.  Negative Bob's.  Sensation intact to light touch.  5/5 strength.  2+ posterior tibial pulse.    Results Review:   Imaging Results (Last 24 Hours)       Procedure Component Value Units Date/Time    XR Knee 4+ View Right [[id number]] Resulted: 08/22/23 0908     Updated: 08/22/23 0934        I personally reviewed the radiographs of the right knee.  No acute fracture or dislocation.  There is severe tricompartmental osteoarthritis with bone-on-bone arthritis in both the medial and lateral joint spaces.    Procedures    Assessment / Plan    Assessment/Plan:   Diagnoses and all orders for this visit:    1. Right knee pain, unspecified chronicity (Primary)  -     XR Knee 4+ View Right    2. Pes anserine bursitis  -     Diclofenac Sodium (VOLTAREN) 1 % gel gel; Apply 4g to affected area 3-4 times per day  Dispense: 100 g; Refill: 1    3. Primary osteoarthritis of right knee      Worsening right knee pain for the last 1 year.  Her radiographs show severe osteoarthritis with loss of joint space in both the medial and " lateral compartments.  She has a large joint effusion.  However today, she is most tender over the pes bursa distal to the joint line.  On exam, she has tenderness at the bursa, a joint effusion, and limited extension of the knee.  This is because of her severe arthritis and not from a detached extensor mechanism.  I provided her with a copy of her radiographs and we discussed them in detail.  For her arthritis and bursitis, I recommend that we start a topical anti-inflammatory medication.  She should continue in physical therapy to improve her strength, ambulation, and overall reduce her pain.  I am willing to perform injections into both the bursa and joint if she were to desire this but certainly does not today.  Follow-up will be as needed but I am happy to see her back at any point.    Previous documentation reviewed: 8/8/2023-Scott Helton MD-office visit.    Previous laboratory results reviewed: 8/8/2023-creatinine 0.63, EGFR 90.    Previous imaging studies reviewed: 10/17/2022-MRI right lower extremity.    Follow Up:   Return if symptoms worsen or fail to improve.      London Reyes MD  [facility]

## 2023-10-09 ENCOUNTER — OFFICE VISIT (OUTPATIENT)
Dept: FAMILY MEDICINE CLINIC | Facility: CLINIC | Age: 78
End: 2023-10-09
Payer: MEDICARE

## 2023-10-09 VITALS
OXYGEN SATURATION: 99 % | HEIGHT: 62 IN | BODY MASS INDEX: 18.77 KG/M2 | SYSTOLIC BLOOD PRESSURE: 130 MMHG | HEART RATE: 70 BPM | DIASTOLIC BLOOD PRESSURE: 82 MMHG | WEIGHT: 102 LBS

## 2023-10-09 DIAGNOSIS — R00.2 PALPITATIONS: ICD-10-CM

## 2023-10-09 DIAGNOSIS — M25.561 CHRONIC PAIN OF RIGHT KNEE: Chronic | ICD-10-CM

## 2023-10-09 DIAGNOSIS — G89.29 CHRONIC PAIN OF RIGHT KNEE: Chronic | ICD-10-CM

## 2023-10-09 DIAGNOSIS — G25.81 RESTLESS LEG SYNDROME: Primary | ICD-10-CM

## 2023-10-09 DIAGNOSIS — M25.511 CHRONIC PAIN OF BOTH SHOULDERS: ICD-10-CM

## 2023-10-09 DIAGNOSIS — M70.50 PES ANSERINE BURSITIS: ICD-10-CM

## 2023-10-09 DIAGNOSIS — G89.29 CHRONIC PAIN OF BOTH SHOULDERS: ICD-10-CM

## 2023-10-09 DIAGNOSIS — M25.512 CHRONIC PAIN OF BOTH SHOULDERS: ICD-10-CM

## 2023-10-09 DIAGNOSIS — E67.3 HYPERVITAMINOSIS D: ICD-10-CM

## 2023-10-09 RX ORDER — ROPINIROLE 0.5 MG/1
1.5 TABLET, FILM COATED ORAL NIGHTLY
Qty: 270 TABLET | Refills: 1 | Status: SHIPPED | OUTPATIENT
Start: 2023-10-09

## 2023-10-09 RX ORDER — ATENOLOL 25 MG/1
25 TABLET ORAL DAILY
Qty: 90 TABLET | Refills: 1 | Status: SHIPPED | OUTPATIENT
Start: 2023-10-09

## 2023-10-09 NOTE — PROGRESS NOTES
"Chief Complaint  Palpitations (Med recheck), Restless Legs Syndrome (Med recheck), and Shoulder Pain (Patient )    Subjective    History of Present Illness:  Mary Gocnalves is a 78 y.o. female who presents today for follow-up visit and medication refills.    She has successfully transition from Valium to Requip for restless leg syndrome.  She is up to 1.5 mg with her Requip and doing well with this dosing.  She does need additional refills now that we have tapered her dosing up.    She did see Dr. Reyes for problems with pes anserine bursitis of the right knee and is doing well with topical diclofenac.  She is also found this very helpful for her shoulder pain and tries to avoid oral NSAIDs and use topical NSAIDs instead.    Discussed and recommended Tdap and flu shot.  She voiced understanding but declines vaccination update.    We did order her DEXA at her last visit and she is planning on getting this scheduled.     History of osteoporosis and is willing to get up-to-date with bone density.  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  She is willing to consider Prolia based upon her bone density when it returns.     Fasting labs did show elevated vitamin D and she has stopped her vitamin D supplement.    History of familial hypercholesterolemia and failed numerous statins.  We did discuss that he has an alternative and at this time she would like to hold off on cholesterol medications.  She does understand the risk of untreated hyperlipidemia.     Palpitations stable with atenolol and she has been on this for years and has not had any problems with blood pressure elevation.        Objective   Vital Signs:   /82   Pulse 70   Ht 157.5 cm (62\")   Wt 46.3 kg (102 lb) Comment: patient reported, patient could not stand  SpO2 99%   BMI 18.66 kg/mý     Review of Systems   Constitutional:  Negative for appetite change, chills and fever.   HENT:  Negative for " hearing loss.    Eyes:  Negative for blurred vision.   Respiratory:  Negative for chest tightness.    Cardiovascular:  Negative for chest pain.   Gastrointestinal:  Negative for abdominal pain.   Musculoskeletal:  Positive for arthralgias, gait problem, myalgias, neck pain and bursitis.   Skin:  Negative for rash.   Neurological:  Positive for weakness.   Psychiatric/Behavioral:  Negative for depressed mood.        Past History:  Medical History: has a past medical history of Irregular heart beat and Restless legs syndrome (RLS).   Surgical History: has a past surgical history that includes Hysterectomy; Tonsillectomy; and Dilation and curettage of uterus.   Family History: Family history is unknown by patient.   Social History: reports that she has never smoked. She has never used smokeless tobacco. She reports that she does not currently use alcohol. She reports that she does not use drugs.      Current Outpatient Medications:     atenolol (TENORMIN) 25 MG tablet, Take 1 tablet by mouth Daily., Disp: 90 tablet, Rfl: 1    Diclofenac Sodium (VOLTAREN) 1 % gel gel, Apply 4g to affected area 3-4 times per day, Disp: 350 g, Rfl: 5    rOPINIRole (REQUIP) 0.5 MG tablet, Take 3 tablets by mouth Every Night., Disp: 270 tablet, Rfl: 1    Allergies: Flexeril [cyclobenzaprine] and Statins    Physical Exam  Constitutional:       Appearance: Normal appearance.   HENT:      Head: Normocephalic.      Right Ear: External ear normal.      Left Ear: External ear normal.      Nose: Nose normal.   Eyes:      Pupils: Pupils are equal, round, and reactive to light.   Cardiovascular:      Rate and Rhythm: Normal rate and regular rhythm.      Heart sounds: Normal heart sounds.   Pulmonary:      Effort: Pulmonary effort is normal.      Breath sounds: Normal breath sounds.   Musculoskeletal:      Cervical back: Normal range of motion.   Skin:     General: Skin is warm and dry.   Neurological:      Mental Status: She is alert. Mental status  is at baseline.      Comments: Resting comfortably in wheelchair.   Psychiatric:         Mood and Affect: Mood normal.         Behavior: Behavior normal.         Thought Content: Thought content normal.          Result Review                   Assessment and Plan  Diagnoses and all orders for this visit:    1. Restless leg syndrome (Primary)  Assessment & Plan:  She is doing very well after transitioning from Valium from her past PCP to Requip.  This has controlled her restless leg symptoms much more effectively without side effects.  Refilled at current 1.5 mg dosing.  She does have room to adjust further if needed.    Recheck in 6 months or sooner if problems arise    Orders:  -     rOPINIRole (REQUIP) 0.5 MG tablet; Take 3 tablets by mouth Every Night.  Dispense: 270 tablet; Refill: 1    2. Palpitations  Assessment & Plan:  Continue atenolol for palpitation.      We are still working to get past records from cardiology.  No murmur appreciated on exam today.  Reports mitral valve prolapse and she was instructed to premedicate before dental cleaning.      Discussed this is no longer recommendation for mitral valve prolapse but we will need to review her records in more detail to make sure she does not have any other underlying problems that would require premedication before dental cleaning    Orders:  -     atenolol (TENORMIN) 25 MG tablet; Take 1 tablet by mouth Daily.  Dispense: 90 tablet; Refill: 1    3. Pes anserine bursitis  Assessment & Plan:  Doing well with topical Voltaren gel.  Refilled    Orders:  -     Diclofenac Sodium (VOLTAREN) 1 % gel gel; Apply 4g to affected area 3-4 times per day  Dispense: 350 g; Refill: 5    4. Chronic pain of both shoulders  Assessment & Plan:  She has found topical Voltaren gel very helpful.  Refilled    Orders:  -     Diclofenac Sodium (VOLTAREN) 1 % gel gel; Apply 4g to affected area 3-4 times per day  Dispense: 350 g; Refill: 5    5. Hypervitaminosis D  Assessment &  Plan:  She is off of vitamin D given her vitamin D elevation with last labs.  We will plan to recheck this with her next fasting lab work      6. Chronic pain of right knee  Assessment & Plan:  See above.  Doing well with Voltaren gel topically          BMI is within normal parameters. No other follow-up for BMI required.          Follow Up  Return in about 6 months (around 4/9/2024) for Med recheck, Fasting for labs at appointment (but drink water!).    Scott Helton MD

## 2023-10-09 NOTE — ASSESSMENT & PLAN NOTE
Continue atenolol for palpitation.      We are still working to get past records from cardiology.  No murmur appreciated on exam today.  Reports mitral valve prolapse and she was instructed to premedicate before dental cleaning.      Discussed this is no longer recommendation for mitral valve prolapse but we will need to review her records in more detail to make sure she does not have any other underlying problems that would require premedication before dental cleaning

## 2023-10-09 NOTE — ASSESSMENT & PLAN NOTE
She is doing very well after transitioning from Valium from her past PCP to Requip.  This has controlled her restless leg symptoms much more effectively without side effects.  Refilled at current 1.5 mg dosing.  She does have room to adjust further if needed.    Recheck in 6 months or sooner if problems arise

## 2023-10-09 NOTE — ASSESSMENT & PLAN NOTE
She is off of vitamin D given her vitamin D elevation with last labs.  We will plan to recheck this with her next fasting lab work

## 2024-02-12 ENCOUNTER — TELEPHONE (OUTPATIENT)
Dept: FAMILY MEDICINE CLINIC | Facility: CLINIC | Age: 79
End: 2024-02-12
Payer: COMMERCIAL

## 2024-04-09 ENCOUNTER — OFFICE VISIT (OUTPATIENT)
Dept: FAMILY MEDICINE CLINIC | Facility: CLINIC | Age: 79
End: 2024-04-09
Payer: MEDICARE

## 2024-04-09 VITALS
WEIGHT: 100 LBS | DIASTOLIC BLOOD PRESSURE: 70 MMHG | BODY MASS INDEX: 18.29 KG/M2 | HEART RATE: 72 BPM | OXYGEN SATURATION: 97 % | SYSTOLIC BLOOD PRESSURE: 118 MMHG

## 2024-04-09 DIAGNOSIS — R26.81 GAIT INSTABILITY: ICD-10-CM

## 2024-04-09 DIAGNOSIS — E78.01 FAMILIAL HYPERCHOLESTEREMIA: ICD-10-CM

## 2024-04-09 DIAGNOSIS — Z13.820 ENCOUNTER FOR OSTEOPOROSIS SCREENING IN ASYMPTOMATIC POSTMENOPAUSAL PATIENT: ICD-10-CM

## 2024-04-09 DIAGNOSIS — R00.2 PALPITATIONS: Chronic | ICD-10-CM

## 2024-04-09 DIAGNOSIS — G25.81 RESTLESS LEG SYNDROME: Primary | Chronic | ICD-10-CM

## 2024-04-09 DIAGNOSIS — G89.29 CHRONIC PAIN OF RIGHT KNEE: Chronic | ICD-10-CM

## 2024-04-09 DIAGNOSIS — Z78.0 ENCOUNTER FOR OSTEOPOROSIS SCREENING IN ASYMPTOMATIC POSTMENOPAUSAL PATIENT: ICD-10-CM

## 2024-04-09 DIAGNOSIS — E67.3 HYPERVITAMINOSIS D: ICD-10-CM

## 2024-04-09 DIAGNOSIS — M25.561 CHRONIC PAIN OF RIGHT KNEE: Chronic | ICD-10-CM

## 2024-04-09 PROCEDURE — 1159F MED LIST DOCD IN RCRD: CPT | Performed by: FAMILY MEDICINE

## 2024-04-09 PROCEDURE — 1160F RVW MEDS BY RX/DR IN RCRD: CPT | Performed by: FAMILY MEDICINE

## 2024-04-09 PROCEDURE — 99214 OFFICE O/P EST MOD 30 MIN: CPT | Performed by: FAMILY MEDICINE

## 2024-04-09 PROCEDURE — G2211 COMPLEX E/M VISIT ADD ON: HCPCS | Performed by: FAMILY MEDICINE

## 2024-04-09 RX ORDER — ATENOLOL 25 MG/1
25 TABLET ORAL DAILY
Qty: 90 TABLET | Refills: 1 | Status: SHIPPED | OUTPATIENT
Start: 2024-04-09

## 2024-04-09 RX ORDER — ROPINIROLE 0.5 MG/1
1.5 TABLET, FILM COATED ORAL NIGHTLY
Qty: 270 TABLET | Refills: 1 | Status: SHIPPED | OUTPATIENT
Start: 2024-04-09

## 2024-04-09 NOTE — ASSESSMENT & PLAN NOTE
Improving after conservative tx    Restarting phys therapy for back/rib pains that started with cane use

## 2024-04-09 NOTE — ASSESSMENT & PLAN NOTE
Reactions to statins.  Declines prescription medications for cholesterol.      Will plan to recheck labs at followup

## 2024-04-09 NOTE — LETTER
April 9, 2024     Patient: Mary Goncalves   YOB: 1945   Date of Visit: 4/9/2024       Dx back pain and L rib pain    Order for physical therapy up to 3 times/week for 6 weeks.  Please evaluate and treat         Sincerely,        Scott Helton MD

## 2024-04-09 NOTE — ASSESSMENT & PLAN NOTE
She is doing very well after transitioning from Valium from her past PCP to Requip.  This has controlled her restless leg symptoms much more effectively without side effects.  Refilled at current 1.5 mg dosing.      She does have room to adjust further if needed.

## 2024-04-09 NOTE — PROGRESS NOTES
Chief Complaint  RLS, R leg/knee pain, Palpitations    Subjective    History of Present Illness:  Mary Goncalves is a 79 y.o. female who presents today for 6 month follow-up visit and medication refills.    She has successfully transition from Valium to Requip for restless leg syndrome.  She is up to 1.5 mg with her Requip and doing well with this dosing.      She did see Dr. Reyes for problems with pes anserine bursitis of the right knee and is doing better with brace use but started using a cane.  She is having some L rib and back pains from walking with a caner - no falls since last visit. Would like order to restart PT at [facility] given flareups with back/rib pains.    Declines vaccination update.     We did order her DEXA at her last visit and she is planning on getting this scheduled.     History of osteoporosis and is willing to get up-to-date with bone density.  She did take Fosamax in the past but had complications related to significant side-effects.  She is willing to consider Prolia based upon her bone density when it returns.     Fasting labs in Aug did show elevated vitamin D and she has stopped her vitamin D supplement.    History of familial hypercholesterolemia and failed numerous statins.  We did discuss that he has an alternative and at this time she would like to hold off on cholesterol medications.  She does understand the risk of untreated hyperlipidemia.     Palpitations stable with atenolol and she has been on this for years and has not had any problems with blood pressure elevation.        Objective   Vital Signs:   /70   Pulse 72   Wt 45.4 kg (100 lb)   SpO2 97%   BMI 18.29 kg/m²     Review of Systems   Constitutional:  Negative for appetite change, chills and fever.   HENT:  Negative for hearing loss.    Eyes:  Negative for blurred vision.   Respiratory:  Negative for chest tightness.    Cardiovascular:  Negative for chest pain.   Gastrointestinal:  Negative for abdominal pain.    Musculoskeletal:  Positive for arthralgias, back pain and gait problem.        See hPI   Skin:  Negative for rash.   Psychiatric/Behavioral:  Negative for depressed mood.        Past History:  Medical History: has a past medical history of Irregular heart beat and Restless legs syndrome (RLS).   Surgical History: has a past surgical history that includes Hysterectomy; Tonsillectomy; and Dilation and curettage of uterus.   Family History: Family history is unknown by patient.   Social History: reports that she has never smoked. She has never used smokeless tobacco. She reports that she does not currently use alcohol. She reports that she does not use drugs.      Current Outpatient Medications:     atenolol (TENORMIN) 25 MG tablet, Take 1 tablet by mouth Daily., Disp: 90 tablet, Rfl: 1    rOPINIRole (REQUIP) 0.5 MG tablet, Take 3 tablets by mouth Every Night., Disp: 270 tablet, Rfl: 1    Allergies: Flexeril [cyclobenzaprine] and Statins    Physical Exam  HENT:      Head: Normocephalic.      Right Ear: External ear normal.      Left Ear: External ear normal.      Nose: Nose normal.   Eyes:      Pupils: Pupils are equal, round, and reactive to light.   Cardiovascular:      Rate and Rhythm: Normal rate and regular rhythm.      Heart sounds: Normal heart sounds.   Pulmonary:      Effort: Pulmonary effort is normal.      Breath sounds: Normal breath sounds.   Musculoskeletal:      Cervical back: Normal range of motion.      Comments: Unchanged kyphosis.  Using cane for ambulatory support.  Would like to restart phys therapy for back/rib pain flareups.  R leg pain improved.    Skin:     General: Skin is warm and dry.   Neurological:      Mental Status: She is alert. Mental status is at baseline.   Psychiatric:         Mood and Affect: Mood normal.         Behavior: Behavior normal.         Thought Content: Thought content normal.          Result Review                   Assessment and Plan  Diagnoses and all orders for  this visit:    1. Restless leg syndrome (Primary)  Assessment & Plan:  She is doing very well after transitioning from Valium from her past PCP to Requip.  This has controlled her restless leg symptoms much more effectively without side effects.  Refilled at current 1.5 mg dosing.      She does have room to adjust further if needed.    Orders:  -     rOPINIRole (REQUIP) 0.5 MG tablet; Take 3 tablets by mouth Every Night.  Dispense: 270 tablet; Refill: 1    2. Palpitations  Assessment & Plan:  Continue atenolol for palpitation.      Orders:  -     atenolol (TENORMIN) 25 MG tablet; Take 1 tablet by mouth Daily.  Dispense: 90 tablet; Refill: 1    3. Chronic pain of right knee  Assessment & Plan:  Improving after conservative tx    Restarting phys therapy for back/rib pains that started with cane use       4. Hypervitaminosis D  Assessment & Plan:  She is off of vitamin D given her vitamin D elevation with last labs.  We will plan to recheck this with her next fasting lab work      5. Gait instability  Assessment & Plan:  Physical therapy order given today for recurrent problems with back and rib pain      6. Familial hypercholesteremia  Assessment & Plan:  Reactions to statins.  Declines prescription medications for cholesterol.      Will plan to recheck labs at followup      7. Encounter for osteoporosis screening in asymptomatic postmenopausal patient  -     DEXA Bone Density Axial; Future        BMI is within normal parameters. No other follow-up for BMI required.          Follow Up  Return in about 4 months (around 8/9/2024) for Medicare Wellness, This can be in any appointment spot!.    Scott Helton MD

## 2024-05-24 ENCOUNTER — TELEPHONE (OUTPATIENT)
Dept: FAMILY MEDICINE CLINIC | Facility: CLINIC | Age: 79
End: 2024-05-24

## 2024-05-24 NOTE — TELEPHONE ENCOUNTER
Caller: KELLEY PETERSEN    Relationship to patient: Emergency Contact, IS ON  [employer]    Best call back number: [phone]    Chief complaint: BONE DENSITY SCAN    Type of visit: OFFICE    Requested date: AS SOON AS     Additional notes:DR UP WANTS TO SEE PATIENT TO DISCUSS RESULTS, HUB DID NOT SEE ANY OPENINGS UNTIL JULY 2024

## 2024-07-05 ENCOUNTER — OFFICE VISIT (OUTPATIENT)
Dept: FAMILY MEDICINE CLINIC | Facility: CLINIC | Age: 79
End: 2024-07-05
Payer: MEDICARE

## 2024-07-05 VITALS
DIASTOLIC BLOOD PRESSURE: 80 MMHG | SYSTOLIC BLOOD PRESSURE: 140 MMHG | HEART RATE: 71 BPM | OXYGEN SATURATION: 98 % | WEIGHT: 105.8 LBS | HEIGHT: 65 IN | BODY MASS INDEX: 17.63 KG/M2

## 2024-07-05 DIAGNOSIS — G89.29 CHRONIC PAIN OF RIGHT KNEE: Chronic | ICD-10-CM

## 2024-07-05 DIAGNOSIS — R00.2 PALPITATIONS: Chronic | ICD-10-CM

## 2024-07-05 DIAGNOSIS — G25.81 RESTLESS LEG SYNDROME: Chronic | ICD-10-CM

## 2024-07-05 DIAGNOSIS — M25.561 CHRONIC PAIN OF RIGHT KNEE: Chronic | ICD-10-CM

## 2024-07-05 DIAGNOSIS — M70.50 PES ANSERINE BURSITIS: ICD-10-CM

## 2024-07-05 DIAGNOSIS — M81.0 AGE-RELATED OSTEOPOROSIS WITHOUT CURRENT PATHOLOGICAL FRACTURE: Primary | ICD-10-CM

## 2024-07-05 NOTE — ASSESSMENT & PLAN NOTE
DEXA up-to-date May 2024.    Significant side effects with Fosamax treatment in the past.  Updated allergy list with reaction to Fosamax.    We did discuss osteoporosis treatment options and she is interested in Prolia.    Order given for Prolia and she will plan to come in to get this done in our office as soon as it gets approved and filled through her insurance.    We will plan to reassess her vitamin D level with her next visit and lab work for her annual wellness visit in August.  She does remain off vitamin D given elevated vitamin D levels with past labs

## 2024-07-05 NOTE — PROGRESS NOTES
Chief Complaint  Osteoporosis    Subjective    History of Present Illness:  Mary Goncalves is a 79 y.o. female who presents today for follow-up visit given recent bone density completed May 2, 2024 returned with findings of osteoporosis.    Her lumbar spine T-score was -3.0, left hip T-score -3.2, and right hip T-score -3.1 all within osteoporosis range.    She has been off of vitamin D recently given her past labs did show elevation in her vitamin D with her past daily supplementation.    She was diagnosed with osteoporosis years ago and was on Fosamax for about 18 months and started having significant joint pains and myalgias.  She was taken off of Fosamax at that time and not started on anything else for osteoporosis.  She is interested in osteoporosis treatment with Prolia.  We did discuss we would have to get this prior authorized with her insurance and she would like to start on Prolia given osteoporosis of the lumbar spine and bilateral hips along with bilateral femoral necks with her most recent bone density in May.    She has successfully transition from Valium to Requip for restless leg syndrome.  She is up to 1.5 mg with her Requip and doing well with this dosing.       She did see Dr. 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  She is having some L rib and back pains from walking with a cane - no falls since last visit.  She is benefiting from physical therapy at [facility] given flareups with back/rib pains.     Declines vaccination update.       Fasting labs in Aug did show elevated vitamin D and she has stopped her vitamin D supplement.     History of familial hypercholesterolemia and failed numerous statins. We did discuss that he has an alternative and at this time she would like to hold off on cholesterol medications.  She does understand the risk of untreated hyperlipidemia.     Palpitations stable with atenolol and she  "has been on this for years and has not had any problems with blood pressure elevation.               Objective   Vital Signs:   /80 (BP Location: Left arm, Patient Position: Sitting, Cuff Size: Adult)   Pulse 71   Ht 165.1 cm (65\")   Wt 48 kg (105 lb 12.8 oz)   SpO2 98%   BMI 17.61 kg/m²     Review of Systems   Constitutional:  Negative for appetite change, chills and fever.   HENT:  Negative for hearing loss.    Eyes:  Negative for blurred vision.   Respiratory:  Negative for chest tightness.    Cardiovascular:  Negative for chest pain.   Gastrointestinal:  Negative for abdominal pain.   Musculoskeletal:  Positive for arthralgias and gait problem.   Skin:  Negative for rash.   Neurological:  Positive for weakness.   Psychiatric/Behavioral:  Negative for depressed mood.        Past History:  Medical History: has a past medical history of Irregular heart beat and Restless legs syndrome (RLS).   Surgical History: has a past surgical history that includes Hysterectomy; Tonsillectomy; and Dilation and curettage of uterus.   Family History: Family history is unknown by patient.   Social History: reports that she has never smoked. She has never used smokeless tobacco. She reports that she does not currently use alcohol. She reports that she does not use drugs.      Current Outpatient Medications:     atenolol (TENORMIN) 25 MG tablet, Take 1 tablet by mouth Daily., Disp: 90 tablet, Rfl: 1    rOPINIRole (REQUIP) 0.5 MG tablet, Take 3 tablets by mouth Every Night., Disp: 270 tablet, Rfl: 1    denosumab (PROLIA) 60 MG/ML solution prefilled syringe syringe, Inject 1 mL under the skin into the appropriate area as directed Every 6 (Six) Months., Disp: 1 mL, Rfl: 2    Current Facility-Administered Medications:     [START ON 8/22/2024] denosumab (PROLIA) syringe 60 mg, 60 mg, Subcutaneous, Q6 Months, Scott Helton MD    Allergies: Flexeril [cyclobenzaprine], Fosamax [alendronate], and Statins    Physical " Exam  Constitutional:       Appearance: Normal appearance.   HENT:      Head: Normocephalic.      Right Ear: External ear normal.      Left Ear: External ear normal.      Nose: Nose normal.   Eyes:      Pupils: Pupils are equal, round, and reactive to light.   Cardiovascular:      Rate and Rhythm: Normal rate and regular rhythm.      Heart sounds: Normal heart sounds.   Pulmonary:      Effort: Pulmonary effort is normal.      Breath sounds: Normal breath sounds.   Musculoskeletal:      Comments: Using cane for ambulatory support   Skin:     General: Skin is warm and dry.   Neurological:      Mental Status: She is alert. Mental status is at baseline.      Gait: Gait abnormal.   Psychiatric:         Mood and Affect: Mood normal.         Behavior: Behavior normal.         Thought Content: Thought content normal.          Result Review                   Assessment and Plan  Diagnoses and all orders for this visit:    1. Age-related osteoporosis without current pathological fracture (Primary)  Assessment & Plan:  DEXA up-to-date May 2024.    Significant side effects with Fosamax treatment in the past.  Updated allergy list with reaction to Fosamax.    We did discuss osteoporosis treatment options and she is interested in Prolia.    Order given for Prolia and she will plan to come in to get this done in our office as soon as it gets approved and filled through her insurance.    We will plan to reassess her vitamin D level with her next visit and lab work for her annual wellness visit in August.  She does remain off vitamin D given elevated vitamin D levels with past labs    Orders:  -     denosumab (PROLIA) syringe 60 mg  -     denosumab (PROLIA) 60 MG/ML solution prefilled syringe syringe; Inject 1 mL under the skin into the appropriate area as directed Every 6 (Six) Months.  Dispense: 1 mL; Refill: 2    2. Restless leg syndrome  Assessment & Plan:  She is doing very well after transitioning from Valium from her past PCP  to Requip.  This has controlled her restless leg symptoms much more effectively without side effects.  Refilled at current 1.5 mg dosing.      She does have room to adjust further if needed.      3. Palpitations  Assessment & Plan:  Continue atenolol for palpitation.        4. Pes anserine bursitis  Assessment & Plan:  Doing well with topical Voltaren gel, physical therapy, and ongoing use of knee brace with cane      5. Chronic pain of right knee  Assessment & Plan:  Improving after conservative tx    Restarting phys therapy for back/rib pains that started with cane use has been helpful                    Follow Up  Return in about 7 weeks (around 8/22/2024) for Medicare Wellness.    Scott Helton MD

## 2024-08-22 ENCOUNTER — OFFICE VISIT (OUTPATIENT)
Dept: FAMILY MEDICINE CLINIC | Facility: CLINIC | Age: 79
End: 2024-08-22
Payer: MEDICARE

## 2024-08-22 VITALS
OXYGEN SATURATION: 97 % | SYSTOLIC BLOOD PRESSURE: 130 MMHG | WEIGHT: 107.6 LBS | DIASTOLIC BLOOD PRESSURE: 82 MMHG | HEART RATE: 71 BPM | HEIGHT: 64 IN | BODY MASS INDEX: 18.37 KG/M2

## 2024-08-22 DIAGNOSIS — Z00.00 GENERAL MEDICAL EXAM: Primary | ICD-10-CM

## 2024-08-22 DIAGNOSIS — R26.81 GAIT INSTABILITY: ICD-10-CM

## 2024-08-22 DIAGNOSIS — M81.0 AGE-RELATED OSTEOPOROSIS WITHOUT CURRENT PATHOLOGICAL FRACTURE: Chronic | ICD-10-CM

## 2024-08-22 DIAGNOSIS — G25.81 RESTLESS LEG SYNDROME: Chronic | ICD-10-CM

## 2024-08-22 DIAGNOSIS — G89.29 CHRONIC PAIN OF RIGHT KNEE: Chronic | ICD-10-CM

## 2024-08-22 DIAGNOSIS — E44.1 MILD PROTEIN-CALORIE MALNUTRITION: ICD-10-CM

## 2024-08-22 DIAGNOSIS — R00.2 PALPITATIONS: Chronic | ICD-10-CM

## 2024-08-22 DIAGNOSIS — E78.01 FAMILIAL HYPERCHOLESTEREMIA: ICD-10-CM

## 2024-08-22 DIAGNOSIS — E67.3 HYPERVITAMINOSIS D: ICD-10-CM

## 2024-08-22 DIAGNOSIS — M25.561 CHRONIC PAIN OF RIGHT KNEE: Chronic | ICD-10-CM

## 2024-08-22 PROBLEM — M79.604 ANTERIOR LEG PAIN, RIGHT: Status: RESOLVED | Noted: 2023-08-08 | Resolved: 2024-08-22

## 2024-08-22 RX ORDER — ROPINIROLE 0.5 MG/1
1.5 TABLET, FILM COATED ORAL NIGHTLY
Qty: 270 TABLET | Refills: 1 | Status: SHIPPED | OUTPATIENT
Start: 2024-08-22

## 2024-08-22 RX ORDER — ATENOLOL 25 MG/1
25 TABLET ORAL DAILY
Qty: 90 TABLET | Refills: 1 | Status: SHIPPED | OUTPATIENT
Start: 2024-08-22

## 2024-08-22 NOTE — ASSESSMENT & PLAN NOTE
Improving after conservative tx    Appreciate consultation and treatment with ortho and phys therapy

## 2024-08-22 NOTE — ASSESSMENT & PLAN NOTE
She is off of vitamin D given her vitamin D elevation with last labs.  We will plan to recheck this with her fasting lab work

## 2024-08-22 NOTE — ASSESSMENT & PLAN NOTE
Continue atenolol for palpitation history    No problems or episodes of fluttering/palpitations.

## 2024-08-22 NOTE — PROGRESS NOTES
Subjective   The ABCs of the Annual Wellness Visit  Medicare Wellness Visit      Mary Goncalves is a 79 y.o. patient who presents for a Medicare Wellness Visit.    The following portions of the patient's history were reviewed and   updated as appropriate: allergies, current medications, past family history, past medical history, past social history, past surgical history, and problem list.    Compared to one year ago, the patient's physical   health is better.  Compared to one year ago, the patient's mental   health is better.    Recent Hospitalizations:  She was not admitted to the hospital during the last year.     Current Medical Providers:  Patient Care Team:  Scott Helton MD as PCP - General (Family Medicine)  Herrera Reyes MD as Surgeon (Orthopedic Surgery)    Outpatient Medications Prior to Visit   Medication Sig Dispense Refill    atenolol (TENORMIN) 25 MG tablet Take 1 tablet by mouth Daily. 90 tablet 1    denosumab (PROLIA) 60 MG/ML solution prefilled syringe syringe Inject 1 mL under the skin into the appropriate area as directed Every 6 (Six) Months. 1 mL 2    rOPINIRole (REQUIP) 0.5 MG tablet Take 3 tablets by mouth Every Night. 270 tablet 1     Facility-Administered Medications Prior to Visit   Medication Dose Route Frequency Provider Last Rate Last Admin    denosumab (PROLIA) syringe 60 mg  60 mg Subcutaneous Q6 Months Scott Helton MD         No opioid medication identified on active medication list. I have reviewed chart for other potential  high risk medication/s and harmful drug interactions in the elderly.      Aspirin is not on active medication list.  Aspirin use is not indicated based on review of current medical condition/s. Risk of harm outweighs potential benefits.  .    Patient Active Problem List   Diagnosis    Restless leg syndrome    Chronic pain of right knee    Old tear of meniscus of right knee    Gait instability    Palpitations    General medical exam  "   Familial hypercholesteremia    Skin ulcer of sacrum with fat layer exposed    Pes anserine bursitis    Chronic pain of both shoulders    Hypervitaminosis D    Age-related osteoporosis without current pathological fracture    Mild protein-calorie malnutrition     Advance Care Planning Advance Directive is not on file.  ACP discussion was held with the patient during this visit. Patient does not have an advance directive, information provided.            Objective   Vitals:    24 1007   BP: 130/82   Pulse: 71   SpO2: 97%   Weight: 48.8 kg (107 lb 9.6 oz)   Height: 162.6 cm (64\")       Estimated body mass index is 18.47 kg/m² as calculated from the following:    Height as of this encounter: 162.6 cm (64\").    Weight as of this encounter: 48.8 kg (107 lb 9.6 oz).    BMI is below normal parameters (malnutrition). Recommendations: Encouraged calorie rich foods and nutritional supplementation       Does the patient have evidence of cognitive impairment? No                                                                                                Health  Risk Assessment    Smoking Status:  Social History     Tobacco Use   Smoking Status Never   Smokeless Tobacco Never     Alcohol Consumption:  Social History     Substance and Sexual Activity   Alcohol Use Not Currently       Fall Risk Screen  STEADI Fall Risk Assessment was completed, and patient is at LOW risk for falls.Assessment completed on:2024    Depression Screenin/22/2024     9:58 AM   PHQ-2/PHQ-9 Depression Screening   Little Interest or Pleasure in Doing Things 0-->not at all   Feeling Down, Depressed or Hopeless 0-->not at all   PHQ-9: Brief Depression Severity Measure Score 0     Health Habits and Functional and Cognitive Screenin/22/2024    10:00 AM   Functional & Cognitive Status   Do you have difficulty preparing food and eating? No   Do you have difficulty bathing yourself, getting dressed or grooming yourself? Yes   Do " you have difficulty using the toilet? No   Do you have difficulty moving around from place to place? No   Do you have trouble with steps or getting out of a bed or a chair? No   Current Diet Well Balanced Diet   Dental Exam Up to date   Eye Exam Up to date   Exercise (times per week) 7 times per week   Current Exercises Include Yard Work;House Cleaning   Do you need help using the phone?  No   Are you deaf or do you have serious difficulty hearing?  No   Do you need help to go to places out of walking distance? No   Do you need help shopping? Yes   Do you need help preparing meals?  No   Do you need help with housework?  No   Do you need help with laundry? No   Do you need help taking your medications? No   Do you need help managing money? No   Do you ever drive or ride in a car without wearing a seat belt? No   Have you felt unusual stress, anger or loneliness in the last month? No   Who do you live with? Alone   If you need help, do you have trouble finding someone available to you? No   Have you been bothered in the last four weeks by sexual problems? No   Do you have difficulty concentrating, remembering or making decisions? No           Age-appropriate Screening Schedule:  Refer to the list below for future screening recommendations based on patient's age, sex and/or medical conditions. Orders for these recommended tests are listed in the plan section. The patient has been provided with a written plan.    Health Maintenance List  Health Maintenance   Topic Date Due    LIPID PANEL  08/08/2024    ANNUAL WELLNESS VISIT  08/22/2025    BMI FOLLOWUP  08/22/2025    DXA SCAN  05/02/2026    Pneumococcal Vaccine 65+  Completed    COVID-19 Vaccine  Discontinued    RSV Vaccine - Adults  Discontinued    INFLUENZA VACCINE  Discontinued    TDAP/TD VACCINES  Discontinued    ZOSTER VACCINE  Discontinued                                                                                                                                                 CMS Preventative Services Quick Reference  Risk Factors Identified During Encounter  Fall Risk-High or Moderate: Discussed Fall Prevention in the home  Immunizations Discussed/Encouraged: Influenza    The above risks/problems have been discussed with the patient.  Pertinent information has been shared with the patient in the After Visit Summary.  An After Visit Summary and PPPS were made available to the patient.    Follow Up:   Next Medicare Wellness visit to be scheduled in 1 year.         Additional E&M Note during same encounter follows:  Patient has additional, significant, and separately identifiable condition(s)/problem(s) that require work above and beyond the Medicare Wellness Visit     Chief Complaint  Osteoporosis, RLS, Palpitations, Familial hypercholesterolemia    Subjective   HPI  Mary is also being seen today for additional medical problem/s.  She is here for follow-up and medication refills as well.    She is doing well after our visit in July to discuss abnormal bone density completed May 2, 2024 returned with findings of osteoporosis:    Her lumbar spine T-score was -3.0, left hip T-score -3.2, and right hip T-score -3.1 all within osteoporosis range.  She has been off of vitamin D recently given her past labs did show elevation in her vitamin D with her past daily supplementation.    She was diagnosed with osteoporosis years ago and was on Fosamax for about 18 months and started having significant joint pains and myalgias.  She was taken off of Fosamax at that time and not started on anything else for osteoporosis.  We did plan to restart treatment with prolia but she has decided against treatment given concerns with side-effects with prolia.  We discussed her concerns today and she does not want treatment with prolia or other prescription medications for osteoporosis and understands increased fracture risks.     She has successfully transition from Valium to Requip for restless  "leg syndrome.  She is up to 1.5 mg with her Requip and doing well with this dosing.       She did see Dr. 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  She is having some L rib and back pains from walking with a cane - no falls since last visit.  She is benefiting from physical therapy at [facility] given flareups with back/rib pains.     Declines vaccination update.       Fasting labs in Aug 2023 did show elevated vitamin D and she has stopped her vitamin D supplement.  We will recheck vitamin D with labs today.     History of familial hypercholesterolemia and failed numerous statins. We did discuss that he has an alternative and at this time she would like to hold off on cholesterol medications.  She does understand the risk of untreated hyperlipidemia.     Palpitations stable with atenolol and she has been on this for years and has not had any problems with blood pressure elevation.           Review of Systems   Constitutional:  Negative for activity change, appetite change, chills, fatigue and fever.   HENT:  Negative for ear pain, hearing loss and trouble swallowing.    Eyes:  Negative for pain and visual disturbance.   Respiratory:  Negative for cough, chest tightness, shortness of breath and wheezing.    Cardiovascular:  Negative for chest pain, palpitations and leg swelling.   Gastrointestinal:  Negative for abdominal pain and blood in stool.   Genitourinary:  Negative for difficulty urinating.   Musculoskeletal:  Positive for arthralgias and gait problem. Negative for back pain and joint swelling.        See HPI   Skin:  Negative for rash.   Neurological:  Negative for dizziness, weakness and light-headedness.   Psychiatric/Behavioral:  Negative for agitation, behavioral problems, dysphoric mood and sleep disturbance.               Objective   Vital Signs:  /82   Pulse 71   Ht 162.6 cm (64\")   Wt 48.8 kg (107 lb 9.6 oz)   " SpO2 97%   BMI 18.47 kg/m²   Physical Exam  HENT:      Head: Normocephalic.      Right Ear: External ear normal.      Left Ear: External ear normal.      Nose: Nose normal.   Eyes:      Pupils: Pupils are equal, round, and reactive to light.   Cardiovascular:      Rate and Rhythm: Normal rate and regular rhythm.      Heart sounds: Normal heart sounds. No murmur heard.     No friction rub. No gallop.   Pulmonary:      Effort: Pulmonary effort is normal.      Breath sounds: Normal breath sounds.   Skin:     General: Skin is warm and dry.   Neurological:      Mental Status: She is alert and oriented to person, place, and time. Mental status is at baseline.      Comments: Doing well with ongoing bracing recommended by orthopedics and continued physical therapy and home exercises    Psychiatric:         Mood and Affect: Mood normal.         Behavior: Behavior normal.         Thought Content: Thought content normal.                 Assessment and Plan            Diagnoses and all orders for this visit:    1. General medical exam (Primary)  Assessment & Plan:  Discussed together health maintenance and screening along with vaccination options and healthy diet and exercise habits as part of the preventative counseling at their physical exam today.     Awaiting fasting labs.  Encouraged advance directive today.      2. Age-related osteoporosis without current pathological fracture  Assessment & Plan:  DEXA up-to-date May 2024.    Significant side effects with Fosamax treatment in the past.    She was diagnosed with osteoporosis years ago and was on Fosamax for about 18 months and started having significant joint pains and myalgias.  She was taken off of Fosamax at that time and not started on anything else for osteoporosis.  We did plan to restart treatment with prolia but she has decided against treatment given concerns with side-effects with prolia.  We discussed her concerns today and she does not want treatment with  prolia or other prescription medications for osteoporosis and understands increased fracture risks.     Will recheck vit D today       Orders:  -     Vitamin D,25-Hydroxy; Future  -     Vitamin D,25-Hydroxy    3. Palpitations  Assessment & Plan:  Continue atenolol for palpitation history    No problems or episodes of fluttering/palpitations.      Orders:  -     Magnesium; Future  -     atenolol (TENORMIN) 25 MG tablet; Take 1 tablet by mouth Daily.  Dispense: 90 tablet; Refill: 1  -     Magnesium    4. Restless leg syndrome  Assessment & Plan:  She is doing very well after transitioning from Valium from her past PCP to Requip.  This has controlled her restless leg symptoms much more effectively without side effects.  Refilled at current 1.5 mg dosing.      She does have room to adjust further if needed.    Orders:  -     rOPINIRole (REQUIP) 0.5 MG tablet; Take 3 tablets by mouth Every Night.  Dispense: 270 tablet; Refill: 1    5. Hypervitaminosis D  Assessment & Plan:  She is off of vitamin D given her vitamin D elevation with last labs.  We will plan to recheck this with her fasting lab work    Orders:  -     Vitamin D,25-Hydroxy; Future  -     Vitamin D,25-Hydroxy    6. Chronic pain of right knee  Assessment & Plan:  Improving after conservative tx    Appreciate consultation and treatment with ortho and phys therapy       7. Gait instability  Assessment & Plan:  Continues to benefit from physical therapy and home exercises       8. Familial hypercholesteremia  Assessment & Plan:  Reactions to statins.  Declines prescription medications for cholesterol.      Will plan to recheck labs at followup    Understands risks of untreated hyperlipidemia     Orders:  -     CBC Auto Differential; Future  -     Comprehensive Metabolic Panel; Future  -     Lipid Panel; Future  -     TSH; Future  -     T4, Free; Future  -     CBC Auto Differential  -     Comprehensive Metabolic Panel  -     Lipid Panel  -     TSH  -     T4,  Free    9. Mild protein-calorie malnutrition  Assessment & Plan:  Patient's Body mass index is 18.47 kg/m².  BMI indicates mild protein-calorie malnutrition with health conditions related to an underlying condition that include advancing age, osteoporosis . Weight issue is unchanged. BMI is is below average; BMI management plan is completed.  BMI management for patient includes the following:  Encourage calorie rich foods and nutritional supplementation to help weight .          New Medications Ordered This Visit   Medications    rOPINIRole (REQUIP) 0.5 MG tablet     Sig: Take 3 tablets by mouth Every Night.     Dispense:  270 tablet     Refill:  1    atenolol (TENORMIN) 25 MG tablet     Sig: Take 1 tablet by mouth Daily.     Dispense:  90 tablet     Refill:  1          Follow Up   Return in about 6 months (around 2/22/2025) for Med recheck.  Patient was given instructions and counseling regarding her condition or for health maintenance advice. Please see specific information pulled into the AVS if appropriate.

## 2024-08-22 NOTE — ASSESSMENT & PLAN NOTE
Reactions to statins.  Declines prescription medications for cholesterol.      Will plan to recheck labs at followup    Understands risks of untreated hyperlipidemia

## 2024-08-22 NOTE — ASSESSMENT & PLAN NOTE
Patient's Body mass index is 18.47 kg/m².  BMI indicates mild protein-calorie malnutrition with health conditions related to an underlying condition that include advancing age, osteoporosis . Weight issue is unchanged. BMI is is below average; BMI management plan is completed.  BMI management for patient includes the following:  Encourage calorie rich foods and nutritional supplementation to help weight .

## 2024-08-22 NOTE — ASSESSMENT & PLAN NOTE
DEXA up-to-date May 2024.    Significant side effects with Fosamax treatment in the past.    She was diagnosed with osteoporosis years ago and was on Fosamax for about 18 months and started having significant joint pains and myalgias.  She was taken off of Fosamax at that time and not started on anything else for osteoporosis.  We did plan to restart treatment with prolia but she has decided against treatment given concerns with side-effects with prolia.  We discussed her concerns today and she does not want treatment with prolia or other prescription medications for osteoporosis and understands increased fracture risks.     Will recheck vit D today

## 2024-08-22 NOTE — LETTER
August 22, 2024     Patient: Mary Goncalves   YOB: 1945   Date of Visit: 8/22/2024       Dx Chronic R knee pain, chronic back pain, Osteoarthritis, Gait instability, high risk for falls.     Physical therapy up to 3 times a week for 6-8 weeks.  May restart as often as insurance allows.          Sincerely,        Scott Helton MD

## 2024-08-22 NOTE — ASSESSMENT & PLAN NOTE
Discussed together health maintenance and screening along with vaccination options and healthy diet and exercise habits as part of the preventative counseling at their physical exam today.     Awaiting fasting labs.  Encouraged advance directive today.

## 2024-08-23 LAB
25(OH)D3+25(OH)D2 SERPL-MCNC: 54.2 NG/ML (ref 30–100)
ALBUMIN SERPL-MCNC: 4.2 G/DL (ref 3.8–4.8)
ALP SERPL-CCNC: 120 IU/L (ref 44–121)
ALT SERPL-CCNC: 13 IU/L (ref 0–32)
AST SERPL-CCNC: 22 IU/L (ref 0–40)
BASOPHILS # BLD AUTO: 0.1 X10E3/UL (ref 0–0.2)
BASOPHILS NFR BLD AUTO: 1 %
BILIRUB SERPL-MCNC: 0.6 MG/DL (ref 0–1.2)
BUN SERPL-MCNC: 15 MG/DL (ref 8–27)
BUN/CREAT SERPL: 20 (ref 12–28)
CALCIUM SERPL-MCNC: 9.2 MG/DL (ref 8.7–10.3)
CHLORIDE SERPL-SCNC: 106 MMOL/L (ref 96–106)
CHOLEST SERPL-MCNC: 225 MG/DL (ref 100–199)
CO2 SERPL-SCNC: 24 MMOL/L (ref 20–29)
CREAT SERPL-MCNC: 0.76 MG/DL (ref 0.57–1)
EGFRCR SERPLBLD CKD-EPI 2021: 79 ML/MIN/1.73
EOSINOPHIL # BLD AUTO: 0.3 X10E3/UL (ref 0–0.4)
EOSINOPHIL NFR BLD AUTO: 5 %
ERYTHROCYTE [DISTWIDTH] IN BLOOD BY AUTOMATED COUNT: 12.6 % (ref 11.7–15.4)
GLOBULIN SER CALC-MCNC: 2.5 G/DL (ref 1.5–4.5)
GLUCOSE SERPL-MCNC: 89 MG/DL (ref 70–99)
HCT VFR BLD AUTO: 46.9 % (ref 34–46.6)
HDLC SERPL-MCNC: 63 MG/DL
HGB BLD-MCNC: 14.5 G/DL (ref 11.1–15.9)
IMM GRANULOCYTES # BLD AUTO: 0 X10E3/UL (ref 0–0.1)
IMM GRANULOCYTES NFR BLD AUTO: 0 %
LDLC SERPL CALC-MCNC: 140 MG/DL (ref 0–99)
LYMPHOCYTES # BLD AUTO: 1.2 X10E3/UL (ref 0.7–3.1)
LYMPHOCYTES NFR BLD AUTO: 19 %
MAGNESIUM SERPL-MCNC: 2.3 MG/DL (ref 1.6–2.3)
MCH RBC QN AUTO: 30 PG (ref 26.6–33)
MCHC RBC AUTO-ENTMCNC: 30.9 G/DL (ref 31.5–35.7)
MCV RBC AUTO: 97 FL (ref 79–97)
MONOCYTES # BLD AUTO: 0.5 X10E3/UL (ref 0.1–0.9)
MONOCYTES NFR BLD AUTO: 8 %
NEUTROPHILS # BLD AUTO: 4.3 X10E3/UL (ref 1.4–7)
NEUTROPHILS NFR BLD AUTO: 67 %
PLATELET # BLD AUTO: 270 X10E3/UL (ref 150–450)
POTASSIUM SERPL-SCNC: 4.9 MMOL/L (ref 3.5–5.2)
PROT SERPL-MCNC: 6.7 G/DL (ref 6–8.5)
RBC # BLD AUTO: 4.84 X10E6/UL (ref 3.77–5.28)
SODIUM SERPL-SCNC: 141 MMOL/L (ref 134–144)
T4 FREE SERPL-MCNC: 1.33 NG/DL (ref 0.82–1.77)
TRIGL SERPL-MCNC: 125 MG/DL (ref 0–149)
TSH SERPL DL<=0.005 MIU/L-ACNC: 1.48 UIU/ML (ref 0.45–4.5)
VLDLC SERPL CALC-MCNC: 22 MG/DL (ref 5–40)
WBC # BLD AUTO: 6.3 X10E3/UL (ref 3.4–10.8)

## 2024-08-23 NOTE — PROGRESS NOTES
THE MESSAGE BELOW IS ABLE TO BE GIVEN BY THE HUB.  THE HUB MAY SCHEDULE A FOLLOW-UP VISIT FOR THE PATIENT IF INDICATED IN THE MESSAGE BELOW...    Please call patient with recent lab work results:    Her lipid panel returned with mild cholesterol elevation.  Given her past reaction with cholesterol medications no medications are needed at this time and we will continue to monitor this with future lab work.    Her blood count returned normal with no anemia or evidence for infection.  Her platelets returned normal.    Her thyroid function returned normal.    Her comprehensive metabolic panel returned with normal kidney function, normal liver enzymes, good electrolytes, and normal blood sugar.    We are still waiting on her magnesium and vitamin D levels to return and we will contact them with those results when they become available

## 2025-02-26 ENCOUNTER — OFFICE VISIT (OUTPATIENT)
Dept: FAMILY MEDICINE CLINIC | Facility: CLINIC | Age: 80
End: 2025-02-26
Payer: MEDICARE

## 2025-02-26 VITALS
OXYGEN SATURATION: 96 % | HEART RATE: 76 BPM | WEIGHT: 107.9 LBS | SYSTOLIC BLOOD PRESSURE: 130 MMHG | DIASTOLIC BLOOD PRESSURE: 72 MMHG | BODY MASS INDEX: 18.42 KG/M2 | HEIGHT: 64 IN

## 2025-02-26 DIAGNOSIS — R00.2 PALPITATIONS: Chronic | ICD-10-CM

## 2025-02-26 DIAGNOSIS — G25.81 RESTLESS LEG SYNDROME: Primary | Chronic | ICD-10-CM

## 2025-02-26 DIAGNOSIS — R26.81 GAIT INSTABILITY: Chronic | ICD-10-CM

## 2025-02-26 DIAGNOSIS — M81.0 AGE-RELATED OSTEOPOROSIS WITHOUT CURRENT PATHOLOGICAL FRACTURE: Chronic | ICD-10-CM

## 2025-02-26 DIAGNOSIS — M25.561 CHRONIC PAIN OF RIGHT KNEE: Chronic | ICD-10-CM

## 2025-02-26 DIAGNOSIS — R05.3 CHRONIC COUGH: ICD-10-CM

## 2025-02-26 DIAGNOSIS — E78.01 FAMILIAL HYPERCHOLESTEREMIA: Chronic | ICD-10-CM

## 2025-02-26 DIAGNOSIS — G89.29 CHRONIC PAIN OF RIGHT KNEE: Chronic | ICD-10-CM

## 2025-02-26 PROCEDURE — G2211 COMPLEX E/M VISIT ADD ON: HCPCS | Performed by: FAMILY MEDICINE

## 2025-02-26 PROCEDURE — 99214 OFFICE O/P EST MOD 30 MIN: CPT | Performed by: FAMILY MEDICINE

## 2025-02-26 RX ORDER — ATENOLOL 25 MG/1
25 TABLET ORAL DAILY
Qty: 90 TABLET | Refills: 1 | Status: SHIPPED | OUTPATIENT
Start: 2025-02-26

## 2025-02-26 RX ORDER — ROPINIROLE 0.5 MG/1
1.5 TABLET, FILM COATED ORAL NIGHTLY
Qty: 270 TABLET | Refills: 1 | Status: SHIPPED | OUTPATIENT
Start: 2025-02-26

## 2025-02-26 NOTE — PROGRESS NOTES
Chief Complaint  Osteoporosis, RLS, Palpitations, Familial hypercholesterolemia    Subjective    History of Present Illness:  Mary Goncalves is a 80 y.o. female who presents today for follow-up and medication refills     She has had some problems with recurrent nagging cough.  She did have some mice and squirrels get into her vents and had her events clean last year but does have a recurrent nagging cough.  No hemoptysis.  She does have some mild drainage.  No reflux symptoms.  Will try Mucinex over-the-counter but would like to get chest x-ray today.    Abnormal bone density completed May 2, 2024 returned with findings of osteoporosis:  Her lumbar spine T-score was -3.0, left hip T-score -3.2, and right hip T-score -3.1 all within osteoporosis range.  She has been off of vitamin D recently given her past labs did show elevation in her vitamin D with her past daily supplementation.    She was diagnosed with osteoporosis years ago and was on Fosamax for about 18 months and started having significant joint pains and myalgias.  She was taken off of Fosamax at that time and not started on anything else for osteoporosis.  We did plan to restart treatment with prolia but she has decided against treatment given concerns with side-effects with prolia.  We discussed her concerns today and she does not want treatment with prolia or other prescription medications for osteoporosis and understands increased fracture risks.     She has successfully transition from Valium to Requip for restless leg syndrome.  She is up to 1.5 mg with her Requip and doing well with this dosing.  She is working on trying to taper down if able.     She did see Dr. 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  She is having some L rib and back pains from walking with a cane - no falls since last visit.  She is benefiting from physical therapy and would like a repeat order  "for physical therapy today.  She does plan to try physical therapy in [place].    Declines vaccination update.       Fasting labs in Aug 2023 did show elevated vitamin D and she has stopped her vitamin D supplement.  We will recheck vitamin D with labs today.     History of familial hypercholesterolemia and failed numerous statins. We did discuss that he has an alternative and at this time she would like to hold off on cholesterol medications.  She does understand the risk of untreated hyperlipidemia.     Palpitations stable with atenolol and she has been on this for years and has not had any problems with blood pressure elevation.        Objective   Vital Signs:   /72   Pulse 76   Ht 162.6 cm (64\")   Wt 48.9 kg (107 lb 14.4 oz)   SpO2 96%   BMI 18.52 kg/m²     Review of Systems   Constitutional:  Negative for appetite change, chills and fever.   HENT:  Positive for postnasal drip. Negative for hearing loss.    Eyes:  Negative for blurred vision.   Respiratory:  Positive for cough. Negative for chest tightness.    Cardiovascular:  Negative for chest pain.   Gastrointestinal:  Negative for abdominal pain.   Musculoskeletal:  Positive for arthralgias, back pain and gait problem.        Using walking cane for ambulatory support   Skin:  Negative for rash.   Psychiatric/Behavioral:  Negative for depressed mood.        Past History:  Medical History: has a past medical history of Irregular heart beat and Restless legs syndrome (RLS).   Surgical History: has a past surgical history that includes Hysterectomy; Tonsillectomy; and Dilation and curettage of uterus.   Family History: Family history is unknown by patient.   Social History: reports that she has never smoked. She has never used smokeless tobacco. She reports that she does not currently use alcohol. She reports that she does not use drugs.      Current Outpatient Medications:     atenolol (TENORMIN) 25 MG tablet, Take 1 tablet by mouth Daily., " Disp: 90 tablet, Rfl: 1    rOPINIRole (REQUIP) 0.5 MG tablet, Take 3 tablets by mouth Every Night., Disp: 270 tablet, Rfl: 1    Allergies: Flexeril [cyclobenzaprine], Fosamax [alendronate], and Statins    Physical Exam  Constitutional:       Appearance: Normal appearance.   HENT:      Head: Normocephalic.      Right Ear: External ear normal.      Left Ear: External ear normal.      Nose: Nose normal. Congestion present.   Eyes:      Pupils: Pupils are equal, round, and reactive to light.   Cardiovascular:      Rate and Rhythm: Normal rate and regular rhythm.      Heart sounds: Normal heart sounds. No murmur heard.     No friction rub.   Pulmonary:      Effort: Pulmonary effort is normal.      Breath sounds: Normal breath sounds. No wheezing, rhonchi or rales.   Musculoskeletal:      Comments: Diffuse osteoarthritis.  Plans to restart physical therapy.  Using cane for ambulatory support   Skin:     General: Skin is warm and dry.   Neurological:      Mental Status: She is alert and oriented to person, place, and time. Mental status is at baseline.      Gait: Gait abnormal.      Comments: Stable gait with use of cane for support   Psychiatric:         Mood and Affect: Mood normal.         Behavior: Behavior normal.         Thought Content: Thought content normal.          Result Review                   Assessment and Plan  Diagnoses and all orders for this visit:    1. Restless leg syndrome (Primary)  Assessment & Plan:  She is doing very well after transitioning from Valium from her past PCP to Requip.  This has controlled her restless leg symptoms much more effectively without side effects.  Refilled at current 1.5 mg dosing.      She does have room to adjust further if needed.    Orders:  -     rOPINIRole (REQUIP) 0.5 MG tablet; Take 3 tablets by mouth Every Night.  Dispense: 270 tablet; Refill: 1    2. Palpitations  Assessment & Plan:  Continue atenolol for palpitation history    No problems or episodes of  fluttering/palpitations.      Orders:  -     atenolol (TENORMIN) 25 MG tablet; Take 1 tablet by mouth Daily.  Dispense: 90 tablet; Refill: 1    3. Familial hypercholesteremia  Assessment & Plan:  Reactions to statins.  Declines prescription medications for cholesterol.      Will plan to recheck labs at followup    Understands risks of untreated hyperlipidemia       4. Age-related osteoporosis without current pathological fracture  Assessment & Plan:  DEXA up-to-date May 2024.    Significant side effects with Fosamax treatment in the past.    She was diagnosed with osteoporosis years ago and was on Fosamax for about 18 months and started having significant joint pains and myalgias.  She was taken off of Fosamax at that time and not started on anything else for osteoporosis.  We did plan to restart treatment with prolia but she has decided against treatment given concerns with side-effects with prolia.  We discussed her concerns today and she does not want treatment with prolia or other prescription medications for osteoporosis and understands increased fracture risks.         5. Chronic cough  Assessment & Plan:  Discussed Mucinex for chest congestion.  Lungs clear.  We will check a chest x-ray given recurrent cough and discussed considering Singulair due to concerns for indoor and outdoor allergy combination and contributing to her cough.  No reflux symptoms.    She will consider Singulair after a trial with Mucinex    Orders:  -     XR Chest 2 View; Future    6. Chronic pain of right knee  Assessment & Plan:  Improving after conservative tx    Appreciate consultation and treatment with ortho and phys therapy       7. Gait instability  Assessment & Plan:  Continues to benefit from physical therapy and home exercises                     Follow Up  Return in 26 weeks (on 8/27/2025) for Medicare Wellness.    Scott Helton MD

## 2025-02-26 NOTE — ASSESSMENT & PLAN NOTE
DEXA up-to-date May 2024.    Significant side effects with Fosamax treatment in the past.    She was diagnosed with osteoporosis years ago and was on Fosamax for about 18 months and started having significant joint pains and myalgias.  She was taken off of Fosamax at that time and not started on anything else for osteoporosis.  We did plan to restart treatment with prolia but she has decided against treatment given concerns with side-effects with prolia.  We discussed her concerns today and she does not want treatment with prolia or other prescription medications for osteoporosis and understands increased fracture risks.

## 2025-02-26 NOTE — ASSESSMENT & PLAN NOTE
Discussed Mucinex for chest congestion.  Lungs clear.  We will check a chest x-ray given recurrent cough and discussed considering Singulair due to concerns for indoor and outdoor allergy combination and contributing to her cough.  No reflux symptoms.    She will consider Singulair after a trial with Mucinex

## 2025-02-26 NOTE — LETTER
February 26, 2025     Patient: Mary Goncalves   YOB: 1945   Date of Visit: 2/26/2025     Dx Chronic R knee pain, chronic back pain, Osteoarthritis, Gait instability, high risk for falls.      Physical therapy up to 3 times a week for 6-8 weeks.  May restart as often as insurance allows.             Sincerely,           Scott Helton MD

## 2025-03-05 ENCOUNTER — TELEPHONE (OUTPATIENT)
Dept: FAMILY MEDICINE CLINIC | Facility: CLINIC | Age: 80
End: 2025-03-05
Payer: COMMERCIAL

## 2025-03-05 NOTE — TELEPHONE ENCOUNTER
"*HUB CAN RELAY*    Fara Means stated:    \"Her chest x-ray showed some scarring but no evidence of pneumonia or fluid in her lungs.  She should continue medications and follow-up as per Dr. Shelton's instructions.\"  "

## 2025-03-05 NOTE — TELEPHONE ENCOUNTER
Name: Mary Goncalves      Relationship: Self      Best Callback Number: [phone] (home)        HUB PROVIDED THE RELAY MESSAGE FROM THE OFFICE      PATIENT: VOICED UNDERSTANDING AND HAS NO FURTHER QUESTIONS AT THIS TIME    ADDITIONAL INFORMATION:

## 2025-08-27 ENCOUNTER — OFFICE VISIT (OUTPATIENT)
Dept: FAMILY MEDICINE CLINIC | Facility: CLINIC | Age: 80
End: 2025-08-27
Payer: MEDICARE

## 2025-08-27 VITALS
WEIGHT: 107.3 LBS | BODY MASS INDEX: 18.32 KG/M2 | SYSTOLIC BLOOD PRESSURE: 112 MMHG | HEART RATE: 76 BPM | DIASTOLIC BLOOD PRESSURE: 64 MMHG | OXYGEN SATURATION: 98 % | HEIGHT: 64 IN

## 2025-08-27 DIAGNOSIS — M25.561 CHRONIC PAIN OF RIGHT KNEE: ICD-10-CM

## 2025-08-27 DIAGNOSIS — E44.1 MILD PROTEIN-CALORIE MALNUTRITION: Chronic | ICD-10-CM

## 2025-08-27 DIAGNOSIS — E78.01 FAMILIAL HYPERCHOLESTEREMIA: ICD-10-CM

## 2025-08-27 DIAGNOSIS — M81.0 AGE-RELATED OSTEOPOROSIS WITHOUT CURRENT PATHOLOGICAL FRACTURE: ICD-10-CM

## 2025-08-27 DIAGNOSIS — G25.81 RESTLESS LEG SYNDROME: ICD-10-CM

## 2025-08-27 DIAGNOSIS — R26.81 GAIT INSTABILITY: ICD-10-CM

## 2025-08-27 DIAGNOSIS — T63.441A BEE STING REACTION, ACCIDENTAL OR UNINTENTIONAL, INITIAL ENCOUNTER: ICD-10-CM

## 2025-08-27 DIAGNOSIS — G89.29 CHRONIC PAIN OF RIGHT KNEE: ICD-10-CM

## 2025-08-27 DIAGNOSIS — E67.3 HYPERVITAMINOSIS D: ICD-10-CM

## 2025-08-27 DIAGNOSIS — Z00.00 GENERAL MEDICAL EXAM: Primary | ICD-10-CM

## 2025-08-27 DIAGNOSIS — R00.2 PALPITATIONS: ICD-10-CM

## 2025-08-27 PROBLEM — R05.3 CHRONIC COUGH: Status: RESOLVED | Noted: 2025-02-26 | Resolved: 2025-08-27

## 2025-08-27 PROBLEM — T63.481A INSECT STING: Status: ACTIVE | Noted: 2025-08-26

## 2025-08-27 RX ORDER — ROPINIROLE 0.5 MG/1
1.5 TABLET, FILM COATED ORAL NIGHTLY
Qty: 270 TABLET | Refills: 1 | Status: SHIPPED | OUTPATIENT
Start: 2025-08-27

## 2025-08-27 RX ORDER — ATENOLOL 25 MG/1
25 TABLET ORAL DAILY
Qty: 90 TABLET | Refills: 1 | Status: SHIPPED | OUTPATIENT
Start: 2025-08-27